# Patient Record
Sex: MALE | Race: WHITE | Employment: FULL TIME | URBAN - METROPOLITAN AREA
[De-identification: names, ages, dates, MRNs, and addresses within clinical notes are randomized per-mention and may not be internally consistent; named-entity substitution may affect disease eponyms.]

---

## 2019-11-05 PROBLEM — R06.00 DYSPNEA ON EXERTION: Status: ACTIVE | Noted: 2019-11-05

## 2019-11-05 PROBLEM — R06.09 DYSPNEA ON EXERTION: Status: ACTIVE | Noted: 2019-11-05

## 2020-06-12 PROBLEM — R35.1 BPH ASSOCIATED WITH NOCTURIA: Status: ACTIVE | Noted: 2020-06-12

## 2020-06-12 PROBLEM — I25.84 CORONARY ARTERY CALCIFICATION: Status: ACTIVE | Noted: 2020-06-12

## 2020-06-12 PROBLEM — H25.9 AGE-RELATED CATARACT OF BOTH EYES: Status: ACTIVE | Noted: 2020-06-12

## 2020-06-12 PROBLEM — E66.01 CLASS 2 SEVERE OBESITY DUE TO EXCESS CALORIES WITH SERIOUS COMORBIDITY IN ADULT, UNSPECIFIED BMI (HCC): Status: ACTIVE | Noted: 2020-06-12

## 2020-06-12 PROBLEM — I25.10 CORONARY ARTERY CALCIFICATION: Status: RESOLVED | Noted: 2020-06-12 | Resolved: 2020-06-12

## 2020-06-12 PROBLEM — E78.5 DYSLIPIDEMIA: Status: ACTIVE | Noted: 2020-06-12

## 2020-06-12 PROBLEM — I25.84 CORONARY ARTERY CALCIFICATION: Status: RESOLVED | Noted: 2020-06-12 | Resolved: 2020-06-12

## 2020-06-12 PROBLEM — J84.9 INTERSTITIAL LUNG DISEASE (HCC): Status: ACTIVE | Noted: 2020-06-12

## 2020-06-12 PROBLEM — N40.1 BPH ASSOCIATED WITH NOCTURIA: Status: ACTIVE | Noted: 2020-06-12

## 2020-06-12 PROBLEM — J47.9 BRONCHIECTASIS (HCC): Status: ACTIVE | Noted: 2020-06-12

## 2020-06-12 PROBLEM — M47.819 ARTHRITIS OF LOW BACK: Status: ACTIVE | Noted: 2020-06-12

## 2020-06-12 PROBLEM — J84.10 GRANULOMATOUS LUNG DISEASE (HCC): Status: ACTIVE | Noted: 2020-06-12

## 2020-06-12 PROBLEM — I51.5 CARDIAC CALCIFICATION (HCC): Status: ACTIVE | Noted: 2020-06-12

## 2020-06-12 PROBLEM — I70.0 AORTIC ATHEROSCLEROSIS (HCC): Status: ACTIVE | Noted: 2020-06-12

## 2020-06-12 PROBLEM — I25.10 CORONARY ARTERY CALCIFICATION: Status: ACTIVE | Noted: 2020-06-12

## 2020-06-12 PROBLEM — N40.1 BENIGN PROSTATIC HYPERPLASIA WITH LOWER URINARY TRACT SYMPTOMS: Status: ACTIVE | Noted: 2020-06-12

## 2020-06-12 PROBLEM — J67.9 PNEUMONITIS, HYPERSENSITIVITY (HCC): Status: ACTIVE | Noted: 2020-06-12

## 2020-06-12 PROBLEM — N40.1 BENIGN PROSTATIC HYPERPLASIA WITH LOWER URINARY TRACT SYMPTOMS: Status: RESOLVED | Noted: 2020-06-12 | Resolved: 2020-06-12

## 2020-10-12 PROBLEM — R97.20 ELEVATED PSA: Status: ACTIVE | Noted: 2020-10-12

## 2020-10-15 PROBLEM — I65.23 BILATERAL CAROTID ARTERY STENOSIS: Status: ACTIVE | Noted: 2020-10-15

## 2020-12-15 ENCOUNTER — ANESTHESIA (OUTPATIENT)
Dept: SURGERY | Facility: HOSPITAL | Age: 75
End: 2020-12-15
Payer: MEDICARE

## 2020-12-15 ENCOUNTER — ANESTHESIA EVENT (OUTPATIENT)
Dept: SURGERY | Facility: HOSPITAL | Age: 75
End: 2020-12-15
Payer: MEDICARE

## 2020-12-15 ENCOUNTER — HOSPITAL ENCOUNTER (OUTPATIENT)
Facility: HOSPITAL | Age: 75
Setting detail: HOSPITAL OUTPATIENT SURGERY
Discharge: HOME | End: 2020-12-15
Attending: OPHTHALMOLOGY | Admitting: OPHTHALMOLOGY
Payer: MEDICARE

## 2020-12-15 VITALS
RESPIRATION RATE: 16 BRPM | HEIGHT: 71.5 IN | TEMPERATURE: 97 F | WEIGHT: 225 LBS | DIASTOLIC BLOOD PRESSURE: 75 MMHG | OXYGEN SATURATION: 97 % | SYSTOLIC BLOOD PRESSURE: 155 MMHG | BODY MASS INDEX: 30.81 KG/M2 | HEART RATE: 52 BPM

## 2020-12-15 DIAGNOSIS — H25.811 COMBINED FORMS OF AGE-RELATED CATARACT OF RIGHT EYE: Primary | ICD-10-CM

## 2020-12-15 PROCEDURE — 08RK3JZ REPLACEMENT OF LEFT LENS WITH SYNTHETIC SUBSTITUTE, PERCUTANEOUS APPROACH: ICD-10-PCS | Performed by: OPHTHALMOLOGY

## 2020-12-15 DEVICE — IMPLANTABLE DEVICE: Type: IMPLANTABLE DEVICE | Site: EYE | Status: FUNCTIONAL

## 2020-12-15 RX ORDER — TROPICAMIDE 10 MG/ML
1 SOLUTION/ DROPS OPHTHALMIC SEE ADMIN INSTRUCTIONS
Status: COMPLETED | OUTPATIENT
Start: 2020-12-15 | End: 2020-12-15

## 2020-12-15 RX ORDER — ONDANSETRON 2 MG/ML
4 INJECTION INTRAMUSCULAR; INTRAVENOUS AS NEEDED
Status: DISCONTINUED | OUTPATIENT
Start: 2020-12-15 | End: 2020-12-15

## 2020-12-15 RX ORDER — NALOXONE HYDROCHLORIDE 0.4 MG/ML
80 INJECTION, SOLUTION INTRAMUSCULAR; INTRAVENOUS; SUBCUTANEOUS AS NEEDED
Status: DISCONTINUED | OUTPATIENT
Start: 2020-12-15 | End: 2020-12-15

## 2020-12-15 RX ORDER — LIDOCAINE HYDROCHLORIDE 10 MG/ML
INJECTION, SOLUTION EPIDURAL; INFILTRATION; INTRACAUDAL; PERINEURAL AS NEEDED
Status: DISCONTINUED | OUTPATIENT
Start: 2020-12-15 | End: 2020-12-15 | Stop reason: HOSPADM

## 2020-12-15 RX ORDER — MOXIFLOXACIN 5 MG/ML
SOLUTION/ DROPS OPHTHALMIC AS NEEDED
Status: DISCONTINUED | OUTPATIENT
Start: 2020-12-15 | End: 2020-12-15 | Stop reason: HOSPADM

## 2020-12-15 RX ORDER — TETRACAINE HYDROCHLORIDE 5 MG/ML
1 SOLUTION OPHTHALMIC SEE ADMIN INSTRUCTIONS
Status: COMPLETED | OUTPATIENT
Start: 2020-12-15 | End: 2020-12-15

## 2020-12-15 RX ORDER — HYDROMORPHONE HYDROCHLORIDE 1 MG/ML
0.4 INJECTION, SOLUTION INTRAMUSCULAR; INTRAVENOUS; SUBCUTANEOUS EVERY 5 MIN PRN
Status: DISCONTINUED | OUTPATIENT
Start: 2020-12-15 | End: 2020-12-15

## 2020-12-15 RX ORDER — BALANCED SALT SOLUTION 6.4; .75; .48; .3; 3.9; 1.7 MG/ML; MG/ML; MG/ML; MG/ML; MG/ML; MG/ML
SOLUTION OPHTHALMIC AS NEEDED
Status: DISCONTINUED | OUTPATIENT
Start: 2020-12-15 | End: 2020-12-15 | Stop reason: HOSPADM

## 2020-12-15 RX ORDER — PHENYLEPHRINE HCL 2.5 %
1 DROPS OPHTHALMIC (EYE) SEE ADMIN INSTRUCTIONS
Status: COMPLETED | OUTPATIENT
Start: 2020-12-15 | End: 2020-12-15

## 2020-12-15 RX ORDER — SODIUM CHLORIDE, SODIUM LACTATE, POTASSIUM CHLORIDE, CALCIUM CHLORIDE 600; 310; 30; 20 MG/100ML; MG/100ML; MG/100ML; MG/100ML
INJECTION, SOLUTION INTRAVENOUS CONTINUOUS
Status: DISCONTINUED | OUTPATIENT
Start: 2020-12-15 | End: 2020-12-15

## 2020-12-15 RX ORDER — ACETAMINOPHEN 500 MG
1000 TABLET ORAL EVERY 6 HOURS PRN
COMMUNITY

## 2020-12-15 RX ORDER — MIDAZOLAM HYDROCHLORIDE 1 MG/ML
INJECTION INTRAMUSCULAR; INTRAVENOUS AS NEEDED
Status: DISCONTINUED | OUTPATIENT
Start: 2020-12-15 | End: 2020-12-15 | Stop reason: SURG

## 2020-12-15 RX ORDER — TETRACAINE HYDROCHLORIDE 5 MG/ML
SOLUTION OPHTHALMIC AS NEEDED
Status: DISCONTINUED | OUTPATIENT
Start: 2020-12-15 | End: 2020-12-15 | Stop reason: HOSPADM

## 2020-12-15 RX ORDER — CYCLOPENTOLATE HYDROCHLORIDE 10 MG/ML
1 SOLUTION/ DROPS OPHTHALMIC SEE ADMIN INSTRUCTIONS
Status: COMPLETED | OUTPATIENT
Start: 2020-12-15 | End: 2020-12-15

## 2020-12-15 RX ADMIN — MIDAZOLAM HYDROCHLORIDE 1 MG: 1 INJECTION INTRAMUSCULAR; INTRAVENOUS at 07:21:00

## 2020-12-15 NOTE — OPERATIVE REPORT
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 The same instrument was also used to perform hydroelineation.   Next, the phacoemulsification unit hand piece was used with a second instrument to perform removal of

## 2020-12-15 NOTE — ANESTHESIA PREPROCEDURE EVALUATION
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 Apply to both feet and toes daily x 4 weeks, Disp: 60 g, Rfl: 11, Past Month at Unknown time    •  Ciclopirox 8 % External Solution, Apply 1 Application topically nightly.  Apply nightly for 7 days, then remove with alcohol and repeat for 11 months t Cigarettes        Quit date: 3/1/1973        Years since quittin.8      Smokeless tobacco: Never Used      Tobacco comment: former social smoker when drinking    Alcohol use: Not Currently      Alcohol/week: 0.0 standard drinks      Comment: last drin

## 2020-12-15 NOTE — ANESTHESIA POSTPROCEDURE EVALUATION
[facility] Patient Status:  Hospital Outpatient Surgery   Age/Gender 66year old male MRN [id number]   Location [address] [place] [address] Attending Ck Emmanuel MD   Hosp Day # 0 PCP MD Neal Lopez

## 2020-12-15 NOTE — H&P
[address] Patient Status:  Hospital Outpatient Surgery    10/31/1945 MRN [id number]   Location [address] [place] [address] Attending Bettie Benavidez MD   [facility] Day # 0 PCP MD Maggie Byrd Comment:cat    Medications:    •  acetaminophen 500 MG Oral Tab, Take 1,000 mg by mouth every 6 (six) hours as needed for Pain., Disp: , Rfl: , 12/15/2020 at 0315    •  zolpidem 5 MG Oral Tab, Take 1 tablet (5 mg total) by mouth daily. , Disp: 30 tablet, Rf 4.00 years of use. He has never used smokeless tobacco. He reports previous alcohol use. He reports that he does not use drugs.     Family History:  Family History   Problem Relation Age of Onset   • Cancer Mother    • Cancer Sister        Review of Systems

## 2020-12-15 NOTE — OR NURSING
Discharge instructions discussed with patient, verbalized understanding. Patient has eye drops and is aware how to take them. Waiting on ride to get here.

## 2021-01-27 ENCOUNTER — OFFICE VISIT (OUTPATIENT)
Dept: INTERNAL MEDICINE CLINIC | Facility: CLINIC | Age: 76
End: 2021-01-27
Payer: MEDICARE

## 2021-01-27 VITALS
HEIGHT: 71.5 IN | BODY MASS INDEX: 31.5 KG/M2 | DIASTOLIC BLOOD PRESSURE: 70 MMHG | RESPIRATION RATE: 18 BRPM | SYSTOLIC BLOOD PRESSURE: 126 MMHG | OXYGEN SATURATION: 92 % | HEART RATE: 58 BPM | WEIGHT: 230 LBS

## 2021-01-27 DIAGNOSIS — B35.4 TINEA CORPORIS: ICD-10-CM

## 2021-01-27 DIAGNOSIS — N64.4 BREAST TENDERNESS IN MALE: Primary | ICD-10-CM

## 2021-01-27 DIAGNOSIS — J84.9 INTERSTITIAL LUNG DISEASE (HCC): ICD-10-CM

## 2021-01-27 PROBLEM — J44.9 ASTHMA WITH COPD (CHRONIC OBSTRUCTIVE PULMONARY DISEASE) (HCC): Chronic | Status: ACTIVE | Noted: 2021-01-27

## 2021-01-27 PROBLEM — J44.89 ASTHMA WITH COPD (CHRONIC OBSTRUCTIVE PULMONARY DISEASE): Chronic | Status: ACTIVE | Noted: 2021-01-27

## 2021-01-27 PROBLEM — J44.9 ASTHMA WITH COPD (CHRONIC OBSTRUCTIVE PULMONARY DISEASE): Chronic | Status: ACTIVE | Noted: 2021-01-27

## 2021-01-27 PROCEDURE — 99204 OFFICE O/P NEW MOD 45 MIN: CPT | Performed by: INTERNAL MEDICINE

## 2021-01-27 PROCEDURE — 3078F DIAST BP <80 MM HG: CPT | Performed by: INTERNAL MEDICINE

## 2021-01-27 PROCEDURE — 3008F BODY MASS INDEX DOCD: CPT | Performed by: INTERNAL MEDICINE

## 2021-01-27 PROCEDURE — 3074F SYST BP LT 130 MM HG: CPT | Performed by: INTERNAL MEDICINE

## 2021-01-27 RX ORDER — CLOTRIMAZOLE AND BETAMETHASONE DIPROPIONATE 10; .64 MG/G; MG/G
CREAM TOPICAL
Qty: 60 G | Refills: 3 | Status: SHIPPED | OUTPATIENT
Start: 2021-01-27

## 2021-01-27 NOTE — PROGRESS NOTES
HPI:    Patient ID: Nesha Doherty is a 66year old male. HPIPatient here to establish as a new pt. Has hx of cat allergy and asthma, hyperlipidemia  And acutely has had some tenderness of the left nipple.   Also has had some dysequilibrium transie Physical Exam   Constitutional: He appears well-developed and well-nourished. HENT:   Head: Normocephalic. Eyes: Pupils are equal, round, and reactive to light. Conjunctivae are normal. No scleral icterus. Neck: No JVD present.  No thyromegaly prese

## 2021-01-29 ENCOUNTER — TELEPHONE (OUTPATIENT)
Dept: INTERNAL MEDICINE CLINIC | Facility: CLINIC | Age: 76
End: 2021-01-29

## 2021-01-29 DIAGNOSIS — N64.4 BREAST TENDERNESS IN MALE: Primary | ICD-10-CM

## 2021-01-29 NOTE — TELEPHONE ENCOUNTER
RN spoke with co-worker at [employer]. Entered order for bilat diagnostic mammogram with US if needed. Dx: breast tenderness in male.     Also submitted a referral for insurance approval given this is not a screening but diagnostic

## 2021-01-29 NOTE — TELEPHONE ENCOUNTER
Tariq Riley from [facility] Mammography Dept, called. This patient is scheduled Monday for testing, due to breast tenderness.     The order needs to be put in for a diagnostic test.  They can't use the fake order that was put in by the scheduling departme

## 2021-02-01 ENCOUNTER — HOSPITAL ENCOUNTER (OUTPATIENT)
Dept: MAMMOGRAPHY | Facility: HOSPITAL | Age: 76
Discharge: HOME | End: 2021-02-01
Attending: INTERNAL MEDICINE
Payer: MEDICARE

## 2021-02-01 ENCOUNTER — TELEPHONE (OUTPATIENT)
Dept: INTERNAL MEDICINE CLINIC | Facility: CLINIC | Age: 76
End: 2021-02-01

## 2021-02-01 DIAGNOSIS — N64.4 BREAST TENDERNESS IN MALE: ICD-10-CM

## 2021-02-01 DIAGNOSIS — Z23 NEED FOR VACCINATION: ICD-10-CM

## 2021-02-01 PROCEDURE — 76642 ULTRASOUND BREAST LIMITED: CPT | Performed by: INTERNAL MEDICINE

## 2021-02-01 PROCEDURE — 77062 BREAST TOMOSYNTHESIS BI: CPT | Performed by: INTERNAL MEDICINE

## 2021-02-01 PROCEDURE — 77066 DX MAMMO INCL CAD BI: CPT | Performed by: INTERNAL MEDICINE

## 2021-02-15 ENCOUNTER — OFFICE VISIT (OUTPATIENT)
Dept: INTERNAL MEDICINE CLINIC | Facility: CLINIC | Age: 76
End: 2021-02-15
Payer: MEDICARE

## 2021-02-15 VITALS
HEART RATE: 72 BPM | DIASTOLIC BLOOD PRESSURE: 70 MMHG | SYSTOLIC BLOOD PRESSURE: 118 MMHG | HEIGHT: 71.5 IN | WEIGHT: 226.19 LBS | OXYGEN SATURATION: 96 % | BODY MASS INDEX: 30.97 KG/M2

## 2021-02-15 DIAGNOSIS — L91.8 SKIN TAGS, MULTIPLE ACQUIRED: ICD-10-CM

## 2021-02-15 DIAGNOSIS — E78.5 DYSLIPIDEMIA: Primary | ICD-10-CM

## 2021-02-15 LAB
ALBUMIN SERPL-MCNC: 3.9 G/DL (ref 3.4–5)
ALBUMIN/GLOB SERPL: 1 {RATIO} (ref 1–2)
ALP LIVER SERPL-CCNC: 75 U/L
ALT SERPL-CCNC: 22 U/L
ANION GAP SERPL CALC-SCNC: 5 MMOL/L (ref 0–18)
AST SERPL-CCNC: 16 U/L (ref 15–37)
BASOPHILS # BLD AUTO: 0.1 X10(3) UL (ref 0–0.2)
BASOPHILS NFR BLD AUTO: 1 %
BILIRUB SERPL-MCNC: 1 MG/DL (ref 0.1–2)
BUN BLD-MCNC: 12 MG/DL (ref 7–18)
BUN/CREAT SERPL: 12.6 (ref 10–20)
CALCIUM BLD-MCNC: 9.3 MG/DL (ref 8.5–10.1)
CHLORIDE SERPL-SCNC: 108 MMOL/L (ref 98–112)
CHOLEST SMN-MCNC: 190 MG/DL (ref ?–200)
CO2 SERPL-SCNC: 27 MMOL/L (ref 21–32)
CREAT BLD-MCNC: 0.95 MG/DL
DEPRECATED RDW RBC AUTO: 43.3 FL (ref 35.1–46.3)
EOSINOPHIL # BLD AUTO: 0.53 X10(3) UL (ref 0–0.7)
EOSINOPHIL NFR BLD AUTO: 5.5 %
ERYTHROCYTE [DISTWIDTH] IN BLOOD BY AUTOMATED COUNT: 12.9 % (ref 11–15)
GLOBULIN PLAS-MCNC: 4.1 G/DL (ref 2.8–4.4)
GLUCOSE BLD-MCNC: 126 MG/DL (ref 70–99)
HCT VFR BLD AUTO: 49.1 %
HDLC SERPL-MCNC: 37 MG/DL (ref 40–59)
HGB BLD-MCNC: 15.7 G/DL
IMM GRANULOCYTES # BLD AUTO: 0.02 X10(3) UL (ref 0–1)
IMM GRANULOCYTES NFR BLD: 0.2 %
LDLC SERPL CALC-MCNC: 108 MG/DL (ref ?–100)
LYMPHOCYTES # BLD AUTO: 3.49 X10(3) UL (ref 1–4)
LYMPHOCYTES NFR BLD AUTO: 36.4 %
M PROTEIN MFR SERPL ELPH: 8 G/DL (ref 6.4–8.2)
MCH RBC QN AUTO: 29.4 PG (ref 26–34)
MCHC RBC AUTO-ENTMCNC: 32 G/DL (ref 31–37)
MCV RBC AUTO: 91.9 FL
MONOCYTES # BLD AUTO: 0.94 X10(3) UL (ref 0.1–1)
MONOCYTES NFR BLD AUTO: 9.8 %
NEUTROPHILS # BLD AUTO: 4.51 X10 (3) UL (ref 1.5–7.7)
NEUTROPHILS # BLD AUTO: 4.51 X10(3) UL (ref 1.5–7.7)
NEUTROPHILS NFR BLD AUTO: 47.1 %
NONHDLC SERPL-MCNC: 153 MG/DL (ref ?–130)
OSMOLALITY SERPL CALC.SUM OF ELEC: 291 MOSM/KG (ref 275–295)
PATIENT FASTING Y/N/NP: NO
PATIENT FASTING Y/N/NP: NO
PLATELET # BLD AUTO: 283 10(3)UL (ref 150–450)
POTASSIUM SERPL-SCNC: 4.3 MMOL/L (ref 3.5–5.1)
RBC # BLD AUTO: 5.34 X10(6)UL
SODIUM SERPL-SCNC: 140 MMOL/L (ref 136–145)
TRIGL SERPL-MCNC: 225 MG/DL (ref 30–149)
VLDLC SERPL CALC-MCNC: 45 MG/DL (ref 0–30)
WBC # BLD AUTO: 9.6 X10(3) UL (ref 4–11)

## 2021-02-15 PROCEDURE — 80061 LIPID PANEL: CPT | Performed by: INTERNAL MEDICINE

## 2021-02-15 PROCEDURE — 80053 COMPREHEN METABOLIC PANEL: CPT | Performed by: INTERNAL MEDICINE

## 2021-02-15 PROCEDURE — 36415 COLL VENOUS BLD VENIPUNCTURE: CPT | Performed by: INTERNAL MEDICINE

## 2021-02-15 PROCEDURE — 3008F BODY MASS INDEX DOCD: CPT | Performed by: INTERNAL MEDICINE

## 2021-02-15 PROCEDURE — 3074F SYST BP LT 130 MM HG: CPT | Performed by: INTERNAL MEDICINE

## 2021-02-15 PROCEDURE — 11200 RMVL SKIN TAGS UP TO&INC 15: CPT | Performed by: INTERNAL MEDICINE

## 2021-02-15 PROCEDURE — 3078F DIAST BP <80 MM HG: CPT | Performed by: INTERNAL MEDICINE

## 2021-02-15 PROCEDURE — 85025 COMPLETE CBC W/AUTO DIFF WBC: CPT | Performed by: INTERNAL MEDICINE

## 2021-02-15 PROCEDURE — 99214 OFFICE O/P EST MOD 30 MIN: CPT | Performed by: INTERNAL MEDICINE

## 2021-02-15 NOTE — PROGRESS NOTES
HPI:    Patient ID: Molina Machado is a 66year old male. HPI Pt here for a check up and to remove some skin tags from chest wall and axilla. Had a normal mammogram left breast tenderness is less.     Review of Systems   All other systems reviewed taking: Reported on 1/27/2021 ) 60 g 11     Allergies:  Molds & Smuts           ANGIOEDEMA  Dander                  OTHER (SEE COMMENTS)    Comment:cat   PHYSICAL EXAM:   Physical Exam   Constitutional: He is oriented to person, place, and time.  He appears

## 2021-02-25 ENCOUNTER — TELEPHONE (OUTPATIENT)
Dept: INTERNAL MEDICINE CLINIC | Facility: CLINIC | Age: 76
End: 2021-02-25

## 2021-02-25 NOTE — TELEPHONE ENCOUNTER
RN line  MSG    Pt requesting call back regarding shooting pain to lower back-->especially when bending. No other details on msg. Can be reached @ [phone].

## 2021-03-03 ENCOUNTER — TELEPHONE (OUTPATIENT)
Dept: INTERNAL MEDICINE CLINIC | Facility: CLINIC | Age: 76
End: 2021-03-03

## 2021-03-03 NOTE — TELEPHONE ENCOUNTER
RN line  MSG     Pt requesting call back regarding shooting pain to lower back-->especially when bending.      No other details on msg.      Can be reached @ [phone].       ALSO ASKING FOR ORDER FOR HEARING TEST

## 2021-03-18 ENCOUNTER — TELEPHONE (OUTPATIENT)
Dept: CASE MANAGEMENT | Age: 76
End: 2021-03-18

## 2021-03-19 ENCOUNTER — TELEPHONE (OUTPATIENT)
Dept: INTERNAL MEDICINE CLINIC | Facility: CLINIC | Age: 76
End: 2021-03-19

## 2021-03-19 NOTE — TELEPHONE ENCOUNTER
Reached patient for medication adherence consult. Patient is past due for refill on atorvastatin, last filled 10/26/20 #90. Patient reports he is NOT taking the atorvastatin.  He tells me he did have the medication filled but that he never started [place]

## 2021-03-24 ENCOUNTER — TELEPHONE (OUTPATIENT)
Dept: INTERNAL MEDICINE CLINIC | Facility: CLINIC | Age: 76
End: 2021-03-24

## 2021-03-24 ENCOUNTER — HOSPITAL ENCOUNTER (OUTPATIENT)
Age: 76
Discharge: ANOTHER HOSPITAL | End: 2021-03-24
Attending: PHYSICIAN ASSISTANT
Payer: MEDICARE

## 2021-03-24 ENCOUNTER — APPOINTMENT (OUTPATIENT)
Dept: CT IMAGING | Facility: HOSPITAL | Age: 76
End: 2021-03-24
Attending: EMERGENCY MEDICINE
Payer: MEDICARE

## 2021-03-24 ENCOUNTER — APPOINTMENT (OUTPATIENT)
Dept: CT IMAGING | Facility: HOSPITAL | Age: 76
End: 2021-03-24
Payer: MEDICARE

## 2021-03-24 ENCOUNTER — HOSPITAL ENCOUNTER (EMERGENCY)
Facility: HOSPITAL | Age: 76
Discharge: HOME | End: 2021-03-24
Attending: EMERGENCY MEDICINE
Payer: MEDICARE

## 2021-03-24 ENCOUNTER — APPOINTMENT (OUTPATIENT)
Dept: GENERAL RADIOLOGY | Age: 76
End: 2021-03-24
Attending: PHYSICIAN ASSISTANT
Payer: MEDICARE

## 2021-03-24 VITALS
HEART RATE: 66 BPM | OXYGEN SATURATION: 96 % | SYSTOLIC BLOOD PRESSURE: 135 MMHG | RESPIRATION RATE: 20 BRPM | TEMPERATURE: 98 F | DIASTOLIC BLOOD PRESSURE: 72 MMHG

## 2021-03-24 VITALS
BODY MASS INDEX: 30.07 KG/M2 | SYSTOLIC BLOOD PRESSURE: 142 MMHG | HEART RATE: 60 BPM | TEMPERATURE: 99 F | HEIGHT: 72 IN | OXYGEN SATURATION: 96 % | WEIGHT: 222 LBS | RESPIRATION RATE: 16 BRPM | DIASTOLIC BLOOD PRESSURE: 91 MMHG

## 2021-03-24 DIAGNOSIS — S09.90XA CLOSED HEAD INJURY WITHOUT LOSS OF CONSCIOUSNESS, INITIAL ENCOUNTER: Primary | ICD-10-CM

## 2021-03-24 DIAGNOSIS — S63.619A SPRAIN OF FINGER, UNSPECIFIED FINGER, INITIAL ENCOUNTER: ICD-10-CM

## 2021-03-24 DIAGNOSIS — S09.8XXA BLUNT HEAD TRAUMA, INITIAL ENCOUNTER: Primary | ICD-10-CM

## 2021-03-24 DIAGNOSIS — H53.9 VISION ABNORMALITIES: Primary | ICD-10-CM

## 2021-03-24 DIAGNOSIS — Z98.42: ICD-10-CM

## 2021-03-24 DIAGNOSIS — S16.1XXA STRAIN OF NECK MUSCLE, INITIAL ENCOUNTER: ICD-10-CM

## 2021-03-24 DIAGNOSIS — S00.81XA FACIAL ABRASION, INITIAL ENCOUNTER: ICD-10-CM

## 2021-03-24 PROCEDURE — 99213 OFFICE O/P EST LOW 20 MIN: CPT

## 2021-03-24 PROCEDURE — 90471 IMMUNIZATION ADMIN: CPT

## 2021-03-24 PROCEDURE — 70450 CT HEAD/BRAIN W/O DYE: CPT | Performed by: EMERGENCY MEDICINE

## 2021-03-24 PROCEDURE — 72125 CT NECK SPINE W/O DYE: CPT | Performed by: EMERGENCY MEDICINE

## 2021-03-24 PROCEDURE — 99203 OFFICE O/P NEW LOW 30 MIN: CPT

## 2021-03-24 PROCEDURE — 99284 EMERGENCY DEPT VISIT MOD MDM: CPT

## 2021-03-24 PROCEDURE — 73130 X-RAY EXAM OF HAND: CPT | Performed by: PHYSICIAN ASSISTANT

## 2021-03-24 NOTE — TELEPHONE ENCOUNTER
Needs a retinal specialist per Dr. Demetra Lilly (now with [facility] in [facility]). Please advise. ALSO:  Patient fell today and hurt his hand. Doesn't think it's broken, but not sure. Suggested he go to IC as they have Xray Machine if needed.   He will

## 2021-03-24 NOTE — TELEPHONE ENCOUNTER
MSG    Pt left  requesting a new referral for a retina specialist. Had cataract surgery in December and still having vision problem.

## 2021-03-25 NOTE — ED PROVIDER NOTES
Patient Seen in: [facility]    History   Patient presents with:  Fall    Stated Complaint: hand and face injury from fall    HPI      47-year-old male presents with chief complaint of head injury.   Onset 1 hour prior to arrival.  Patient stat pneumonitis   • Visual impairment     glasses       Past Surgical History:   Procedure Laterality Date   • CHOLECYSTECTOMY  2003   • EYE CATARACT WITH INTRAOCULAR LENS Left 12/15/2020    Performed by Dali Cisneros MD at [facility] MAIN OR   • [address] [place] [address] as noted above. PSFH elements reviewed from today and agreed except as otherwise stated in HPI.     Physical Exam     ED Triage Vitals [03/24/21 1926]   /72   Pulse 66   Resp 20   Temp 98.4 °F (36.9 °C)   Temp src Temporal   SpO2 96 %   O2 Device N range of motion of left fourth and fifth digits with reported pain. Distal pulses intact. Capillary refill normal.  Motor intact distally. Sensory intact distally. No erythema. No open wounds. No compartment syndrome. No other edema.   Remainder of m unspecified finger, initial encounter    Disposition: Ic to ed    Follow-up:  No follow-up provider specified.     Medications Prescribed:  Current Discharge Medication List

## 2021-03-25 NOTE — ED INITIAL ASSESSMENT (HPI)
Patient states he fell a few hours ago on uneven sidewalk, states he fell forward into a grassy area, striking his head. Denies loc, nausea, dizziness. Patient with left hand pain s/p fall.

## 2021-03-26 ENCOUNTER — ORDER TRANSCRIPTION (OUTPATIENT)
Dept: PHYSICAL THERAPY | Facility: HOSPITAL | Age: 76
End: 2021-03-26

## 2021-03-26 DIAGNOSIS — M47.816 LUMBAR SPONDYLOSIS: Primary | ICD-10-CM

## 2021-03-26 NOTE — ED PROVIDER NOTES
Patient Seen in: [facility] AND [facility] Emergency Department      History   Patient presents with:  Fall    Stated Complaint: Fall, Injury.     HPI/Subjective:   HPI    66year old male who had mechanical fall pta hitting head and face on ground and also susta reoperation same year   • SPINE SURGERY PROCEDURE UNLISTED     • TONSILLECTOMY                  Social History    Tobacco Use      Smoking status: Former Smoker        Years: 4.00        Types: Cigarettes        Quit date: 3/1/1973        Years since [facility] respiratory distress. Breath sounds: Normal breath sounds. No wheezing. Abdominal:      General: There is no distension. Palpations: Abdomen is soft. Tenderness: There is no abdominal tenderness. There is no guarding.    Musculoskeletal: above. 3. Right apical 6 mm pulmonary nodule in the setting of biapical pleural/parenchymal scarring.   This finding could relate to nodular parenchymal scarring, however recommend a short-term follow-up chest CT for further assessment (which can be perform

## 2021-04-12 ENCOUNTER — OFFICE VISIT (OUTPATIENT)
Dept: PHYSICAL THERAPY | Age: 76
End: 2021-04-12
Attending: INTERNAL MEDICINE
Payer: MEDICARE

## 2021-04-12 DIAGNOSIS — M47.816 LUMBAR SPONDYLOSIS: ICD-10-CM

## 2021-04-12 PROCEDURE — 97162 PT EVAL MOD COMPLEX 30 MIN: CPT

## 2021-04-12 PROCEDURE — 97110 THERAPEUTIC EXERCISES: CPT

## 2021-04-12 NOTE — PROGRESS NOTES
LUMBAR SPINE EVALUATION:   Referring Physician: Dr. Gamez Failing  Diagnosis: Lumbar spondylosis (Y64.227)   Date of Service: 4/12/2021   Date of Onset: 2-3 weeks ago    PATIENT SUMMARY   Kaleb Segura is a 66year old y/o male who presents to therapy t kyphosis, forward trunk lean in standing    Lumbar ROM:            Pain (+/-)   Flexion          Mod loss            +   Extension Mod loss -   R Sidebend Min loss  +   L Sidebend Min loss +   R Rotation No loss +   L Rotation No loss +     Repeated Motion recreational activities. Frequency / Duration: Patient will be seen for 2x/week or a total of 10 visits over a 90 day period. Treatment will include: Manual Therapy; Therapeutic Exercises; Neuromuscular Re-education; Therapeutic Activity; Patient Garret Donaldson

## 2021-04-14 ENCOUNTER — OFFICE VISIT (OUTPATIENT)
Dept: PHYSICAL THERAPY | Age: 76
End: 2021-04-14
Attending: INTERNAL MEDICINE
Payer: MEDICARE

## 2021-04-14 DIAGNOSIS — M47.816 LUMBAR SPONDYLOSIS: ICD-10-CM

## 2021-04-14 PROCEDURE — 97110 THERAPEUTIC EXERCISES: CPT

## 2021-04-14 NOTE — PROGRESS NOTES
Dx:  Lumbar spondylosis (M47.816)         Authorized # of Visits:  8 ([facility])         Next MD visit: none scheduled  Fall Risk: standard         Precautions:  diabetes, high cholesterol, carotid artery stenosis, asthma with COPD, interstitial lung disea

## 2021-04-19 ENCOUNTER — OFFICE VISIT (OUTPATIENT)
Dept: PHYSICAL THERAPY | Age: 76
End: 2021-04-19
Attending: INTERNAL MEDICINE
Payer: MEDICARE

## 2021-04-19 DIAGNOSIS — M47.816 LUMBAR SPONDYLOSIS: ICD-10-CM

## 2021-04-19 PROCEDURE — 97110 THERAPEUTIC EXERCISES: CPT

## 2021-04-19 NOTE — PROGRESS NOTES
Dx:  Lumbar spondylosis (M47.816)         Authorized # of Visits:  8 ([facility])         Next MD visit: none scheduled  Fall Risk: standard         Precautions:  diabetes, high cholesterol, carotid artery stenosis, asthma with COPD, interstitial lung disea lumbar mobility, LE flexibility, core strength, patient education, posture and body mechanics    Charges: TE x 3       Total Timed Treatment: 40 min  Total Treatment Time: 40 min

## 2021-04-21 ENCOUNTER — OFFICE VISIT (OUTPATIENT)
Dept: PHYSICAL THERAPY | Age: 76
End: 2021-04-21
Attending: INTERNAL MEDICINE
Payer: MEDICARE

## 2021-04-21 DIAGNOSIS — M47.816 LUMBAR SPONDYLOSIS: ICD-10-CM

## 2021-04-21 PROCEDURE — 97110 THERAPEUTIC EXERCISES: CPT

## 2021-04-21 NOTE — PROGRESS NOTES
Dx:  Lumbar spondylosis (M47.816)         Authorized # of Visits:  8 ([facility])         Next MD visit: none scheduled  Fall Risk: standard         Precautions:  diabetes, high cholesterol, carotid artery stenosis, asthma with COPD, interstitial lung disea perform all transfers with pain <2/10. 3. Patient will demo B hip strength at least 4+/5 to be able to squat to  a box with proper body mechanics  4.  Patient will demo good TA contraction to allow for proper bracing for ADLs and recreational [place]

## 2021-04-26 ENCOUNTER — OFFICE VISIT (OUTPATIENT)
Dept: PHYSICAL THERAPY | Age: 76
End: 2021-04-26
Attending: INTERNAL MEDICINE
Payer: MEDICARE

## 2021-04-26 DIAGNOSIS — M47.816 LUMBAR SPONDYLOSIS: ICD-10-CM

## 2021-04-26 PROCEDURE — 97110 THERAPEUTIC EXERCISES: CPT

## 2021-04-26 NOTE — PROGRESS NOTES
Dx:  Lumbar spondylosis (M47.816)         Authorized # of Visits:  8 ([facility])         Next MD visit: none scheduled  Fall Risk: standard         Precautions:  diabetes, high cholesterol, carotid artery stenosis, asthma with COPD, interstitial lung disea of their symptoms. 2. Patient will demo lumbar AROM min loss or better in all limited planes to be able to perform all transfers with pain <2/10.   3. Patient will demo B hip strength at least 4+/5 to be able to squat to  a box with proper body mech

## 2021-04-28 ENCOUNTER — OFFICE VISIT (OUTPATIENT)
Dept: PHYSICAL THERAPY | Age: 76
End: 2021-04-28
Attending: INTERNAL MEDICINE
Payer: MEDICARE

## 2021-04-28 DIAGNOSIS — M47.816 LUMBAR SPONDYLOSIS: ICD-10-CM

## 2021-04-28 PROCEDURE — 97110 THERAPEUTIC EXERCISES: CPT

## 2021-04-28 NOTE — PROGRESS NOTES
Dx:  Lumbar spondylosis (M47.816)         Authorized # of Visits:  8 ([facility])         Next MD visit: none scheduled  Fall Risk: standard         Precautions:  diabetes, high cholesterol, carotid artery stenosis, asthma with COPD, interstitial lung disea stretches         Assessment: Focused on gentle neutral spine core strengthening and hamstring stretching secondary to flare up in pain since last visit. Patient tolerates all exercises with no adverse effects.      Goals (to be achieved in 10 visits):    1

## 2021-05-03 ENCOUNTER — APPOINTMENT (OUTPATIENT)
Dept: PHYSICAL THERAPY | Age: 76
End: 2021-05-03
Attending: INTERNAL MEDICINE
Payer: MEDICARE

## 2021-05-05 ENCOUNTER — OFFICE VISIT (OUTPATIENT)
Dept: PHYSICAL THERAPY | Age: 76
End: 2021-05-05
Attending: INTERNAL MEDICINE
Payer: MEDICARE

## 2021-05-05 DIAGNOSIS — M47.816 LUMBAR SPONDYLOSIS: ICD-10-CM

## 2021-05-05 PROCEDURE — 97110 THERAPEUTIC EXERCISES: CPT

## 2021-05-05 PROCEDURE — 97140 MANUAL THERAPY 1/> REGIONS: CPT

## 2021-05-05 NOTE — PROGRESS NOTES
Dx:  Lumbar spondylosis (M47.816)         Authorized # of Visits:  8 ([facility])         Next MD visit: none scheduled  Fall Risk: standard         Precautions:  diabetes, high cholesterol, carotid artery stenosis, asthma with COPD, interstitial lung disea squat to  a box with proper body mechanics  4. Patient will demo good TA contraction to allow for proper bracing for ADLs and recreational activities.       Plan: Continue PT to improve lumbar mobility, LE flexibility, core strength, patient educatio

## 2021-05-26 ENCOUNTER — APPOINTMENT (OUTPATIENT)
Dept: PHYSICAL THERAPY | Age: 76
End: 2021-05-26
Attending: INTERNAL MEDICINE
Payer: MEDICARE

## 2021-06-07 ENCOUNTER — OFFICE VISIT (OUTPATIENT)
Dept: INTERNAL MEDICINE CLINIC | Facility: CLINIC | Age: 76
End: 2021-06-07
Payer: MEDICARE

## 2021-06-07 VITALS
DIASTOLIC BLOOD PRESSURE: 64 MMHG | BODY MASS INDEX: 29.8 KG/M2 | HEIGHT: 72 IN | WEIGHT: 220 LBS | SYSTOLIC BLOOD PRESSURE: 122 MMHG

## 2021-06-07 DIAGNOSIS — I51.5 CARDIAC CALCIFICATION (HCC): ICD-10-CM

## 2021-06-07 DIAGNOSIS — I70.0 ATHEROSCLEROSIS OF AORTA (HCC): ICD-10-CM

## 2021-06-07 DIAGNOSIS — M47.819 ARTHRITIS OF LOW BACK: ICD-10-CM

## 2021-06-07 DIAGNOSIS — Z00.00 MEDICARE ANNUAL WELLNESS VISIT, SUBSEQUENT: Primary | ICD-10-CM

## 2021-06-07 PROBLEM — J44.9 ASTHMA WITH COPD (CHRONIC OBSTRUCTIVE PULMONARY DISEASE) (HCC): Chronic | Status: RESOLVED | Noted: 2021-01-27 | Resolved: 2021-06-07

## 2021-06-07 PROBLEM — J44.89 ASTHMA WITH COPD (CHRONIC OBSTRUCTIVE PULMONARY DISEASE): Chronic | Status: RESOLVED | Noted: 2021-01-27 | Resolved: 2021-06-07

## 2021-06-07 PROBLEM — J44.9 ASTHMA WITH COPD (CHRONIC OBSTRUCTIVE PULMONARY DISEASE): Chronic | Status: RESOLVED | Noted: 2021-01-27 | Resolved: 2021-06-07

## 2021-06-07 PROCEDURE — 99397 PER PM REEVAL EST PAT 65+ YR: CPT | Performed by: INTERNAL MEDICINE

## 2021-06-07 PROCEDURE — 84153 ASSAY OF PSA TOTAL: CPT | Performed by: INTERNAL MEDICINE

## 2021-06-07 PROCEDURE — 3008F BODY MASS INDEX DOCD: CPT | Performed by: INTERNAL MEDICINE

## 2021-06-07 PROCEDURE — G0439 PPPS, SUBSEQ VISIT: HCPCS | Performed by: INTERNAL MEDICINE

## 2021-06-07 PROCEDURE — 3078F DIAST BP <80 MM HG: CPT | Performed by: INTERNAL MEDICINE

## 2021-06-07 PROCEDURE — 3074F SYST BP LT 130 MM HG: CPT | Performed by: INTERNAL MEDICINE

## 2021-06-07 PROCEDURE — 96160 PT-FOCUSED HLTH RISK ASSMT: CPT | Performed by: INTERNAL MEDICINE

## 2021-06-07 NOTE — PROGRESS NOTES
HPI/Subjective:   Patient ID: Shawna Martinez is a 66year old male. HPIPt here for a MA supervisit.     History/Other:   Review of Systems  Current Outpatient Medications   Medication Sig Dispense Refill   • Diclofenac Sodium (VOLTAREN) 1 % Externa calcification    General Health     In the past six months, have you lost more than 10 pounds without trying?: 2 - No    Has your appetite been poor?: No    Type of Diet: Balanced    How does the patient maintain a good energy level?: Appropriate Exercise Correct    What year is it?: Correct    Recall \"Ball\": Correct    Recall \"Flag\": Correct    Recall \"Tree\": Correct      ALLERGIES:     Molds & Smuts           ANGIOEDEMA  Dander                  OTHER (SEE COMMENTS)    Comment:cat    CURRENT MEDICATI serious comorbidity in adult, unspecified BMI ([facility] [address] 6/12/2020   • Coronary artery calcification 6/12/2020   • Dyslipidemia 6/12/2020   • Dyspnea on exertion; 2019 two years 11/5/2019 11/5/2019 referred to pulmonary   • Elevated PSA; 2020 10/12/2020   • thyroid history  ALL/ASTHMA: denies hx of allergy or asthma    EXAM:   /64   Ht 6' (1.829 m)   Wt 220 lb (99.8 kg)   BMI 29.84 kg/m²      > Wt Readings from Last 6 Encounters:  06/07/21 : 220 lb (99.8 kg)  03/24/21 : 222 lb (100.7 kg)  02/15/21 : 226 found for: A1C No flowsheet data found.     Fasting Blood Sugar (FSB)Annually No results found for: GLUCOSE    Cardiovascular Disease Screening     LDL Annually LDL Cholesterol (mg/dL)   Date Value   02/15/2021 108 (H)     Calculated LDL (mg/dL)   Date Valu DIGOXIN, DIG, VALP No flowsheet data found. Diabetes      HgbA1C  Annually No results found for: A1C No flowsheet data found.     Creat/alb ratio  Annually      LDL  Annually LDL Cholesterol (mg/dL)   Date Value   02/15/2021 108 (H)     Calculated LDL (m

## 2021-06-15 ENCOUNTER — OFFICE VISIT (OUTPATIENT)
Dept: PHYSICAL THERAPY | Age: 76
End: 2021-06-15
Attending: INTERNAL MEDICINE
Payer: MEDICARE

## 2021-06-15 PROCEDURE — 97110 THERAPEUTIC EXERCISES: CPT

## 2021-06-15 NOTE — PROGRESS NOTES
Dx:  Lumbar spondylosis (M47.816)         Authorized # of Visits:  8 ([facility])         Next MD visit: none scheduled  Fall Risk: standard         Precautions:  diabetes, high cholesterol, carotid artery stenosis, asthma with COPD, interstitial lung disea therapy for several weeks. Patient denies any increased pain post session. Goals (to be achieved in 10 visits):    1. Patient will be independent with HEP, it's progression, and self-management of their symptoms.   2. Patient will demo lumbar AROM min l

## 2021-06-17 ENCOUNTER — APPOINTMENT (OUTPATIENT)
Dept: PHYSICAL THERAPY | Age: 76
End: 2021-06-17
Attending: INTERNAL MEDICINE
Payer: MEDICARE

## 2021-06-22 ENCOUNTER — OFFICE VISIT (OUTPATIENT)
Dept: PHYSICAL THERAPY | Age: 76
End: 2021-06-22
Attending: INTERNAL MEDICINE
Payer: MEDICARE

## 2021-06-22 PROCEDURE — 97110 THERAPEUTIC EXERCISES: CPT

## 2021-06-22 NOTE — PROGRESS NOTES
Myra  Pt has attended 9 visits in Physical Therapy.      Dx:  Lumbar spondylosis (M47.816)         Authorized # of Visits:  8 ([facility])         Next MD visit: none scheduled  Fall Risk: standard         Precautions:  diabetes, high cholester well as improved B hip and core strength. Patient has met majority of goals and will be discharged from PT at this time. Goals (to be achieved in 10 visits):    1.  Patient will be independent with HEP, it's progression, and self-management of their sym

## 2021-07-22 ENCOUNTER — APPOINTMENT (OUTPATIENT)
Dept: GENERAL RADIOLOGY | Age: 76
End: 2021-07-22
Attending: EMERGENCY MEDICINE
Payer: MEDICARE

## 2021-07-22 ENCOUNTER — APPOINTMENT (OUTPATIENT)
Dept: CT IMAGING | Age: 76
End: 2021-07-22
Attending: EMERGENCY MEDICINE
Payer: MEDICARE

## 2021-07-22 ENCOUNTER — HOSPITAL ENCOUNTER (OUTPATIENT)
Age: 76
Discharge: HOME | End: 2021-07-22
Attending: EMERGENCY MEDICINE
Payer: MEDICARE

## 2021-07-22 VITALS
OXYGEN SATURATION: 97 % | SYSTOLIC BLOOD PRESSURE: 153 MMHG | RESPIRATION RATE: 24 BRPM | TEMPERATURE: 98 F | HEART RATE: 55 BPM | DIASTOLIC BLOOD PRESSURE: 65 MMHG

## 2021-07-22 DIAGNOSIS — T07.XXXA MULTIPLE CONTUSIONS: ICD-10-CM

## 2021-07-22 DIAGNOSIS — S09.90XA CLOSED HEAD INJURY, INITIAL ENCOUNTER: Primary | ICD-10-CM

## 2021-07-22 DIAGNOSIS — S52.124A CLOSED NONDISPLACED FRACTURE OF HEAD OF RIGHT RADIUS, INITIAL ENCOUNTER: ICD-10-CM

## 2021-07-22 DIAGNOSIS — S37.812A CONTUSION OF ADRENAL GLAND, INITIAL ENCOUNTER: ICD-10-CM

## 2021-07-22 LAB
#MXD IC: 1.1 X10ˆ3/UL (ref 0.1–1)
CREAT BLD-MCNC: 0.9 MG/DL
GLUCOSE BLD-MCNC: 188 MG/DL (ref 70–99)
HCT VFR BLD AUTO: 46.3 %
HGB BLD-MCNC: 14.8 G/DL
ISTAT BUN: 13 MG/DL (ref 7–18)
ISTAT CHLORIDE: 103 MMOL/L (ref 98–112)
ISTAT HEMATOCRIT: 49 %
ISTAT IONIZED CALCIUM FOR CHEM 8: 1.23 MMOL/L (ref 1.12–1.32)
ISTAT POTASSIUM: 3.9 MMOL/L (ref 3.6–5.1)
ISTAT SODIUM: 142 MMOL/L (ref 136–145)
ISTAT TCO2: 26 MMOL/L (ref 21–32)
LYMPHOCYTES # BLD AUTO: 1.8 X10ˆ3/UL (ref 1–4)
LYMPHOCYTES NFR BLD AUTO: 14.1 %
MCH RBC QN AUTO: 29.4 PG (ref 26–34)
MCHC RBC AUTO-ENTMCNC: 32 G/DL (ref 31–37)
MCV RBC AUTO: 92 FL (ref 80–100)
MIXED CELL %: 8.8 %
NEUTROPHILS # BLD AUTO: 10 X10ˆ3/UL (ref 1.5–7.7)
NEUTROPHILS NFR BLD AUTO: 77.1 %
PLATELET # BLD AUTO: 289 X10ˆ3/UL (ref 150–450)
RBC # BLD AUTO: 5.03 X10ˆ6/UL
WBC # BLD AUTO: 12.9 X10ˆ3/UL (ref 4–11)

## 2021-07-22 PROCEDURE — 96360 HYDRATION IV INFUSION INIT: CPT

## 2021-07-22 PROCEDURE — 99215 OFFICE O/P EST HI 40 MIN: CPT

## 2021-07-22 PROCEDURE — 73140 X-RAY EXAM OF FINGER(S): CPT | Performed by: EMERGENCY MEDICINE

## 2021-07-22 PROCEDURE — 73502 X-RAY EXAM HIP UNI 2-3 VIEWS: CPT | Performed by: EMERGENCY MEDICINE

## 2021-07-22 PROCEDURE — 71260 CT THORAX DX C+: CPT | Performed by: EMERGENCY MEDICINE

## 2021-07-22 PROCEDURE — 73080 X-RAY EXAM OF ELBOW: CPT | Performed by: EMERGENCY MEDICINE

## 2021-07-22 PROCEDURE — 29105 APPLICATION LONG ARM SPLINT: CPT

## 2021-07-22 PROCEDURE — 96361 HYDRATE IV INFUSION ADD-ON: CPT

## 2021-07-22 PROCEDURE — 72125 CT NECK SPINE W/O DYE: CPT | Performed by: EMERGENCY MEDICINE

## 2021-07-22 PROCEDURE — 70450 CT HEAD/BRAIN W/O DYE: CPT | Performed by: EMERGENCY MEDICINE

## 2021-07-22 PROCEDURE — 73110 X-RAY EXAM OF WRIST: CPT | Performed by: EMERGENCY MEDICINE

## 2021-07-22 PROCEDURE — 85025 COMPLETE CBC W/AUTO DIFF WBC: CPT | Performed by: EMERGENCY MEDICINE

## 2021-07-22 PROCEDURE — 80047 BASIC METABLC PNL IONIZED CA: CPT

## 2021-07-22 PROCEDURE — 73090 X-RAY EXAM OF FOREARM: CPT | Performed by: EMERGENCY MEDICINE

## 2021-07-22 PROCEDURE — 74177 CT ABD & PELVIS W/CONTRAST: CPT | Performed by: EMERGENCY MEDICINE

## 2021-07-22 RX ORDER — HYDROCODONE BITARTRATE AND ACETAMINOPHEN 5; 325 MG/1; MG/1
1 TABLET ORAL EVERY 6 HOURS PRN
Qty: 20 TABLET | Refills: 0 | Status: SHIPPED | OUTPATIENT
Start: 2021-07-22 | End: 2021-07-29

## 2021-07-22 RX ORDER — SODIUM CHLORIDE 9 MG/ML
1000 INJECTION, SOLUTION INTRAVENOUS ONCE
Status: COMPLETED | OUTPATIENT
Start: 2021-07-22 | End: 2021-07-22

## 2021-07-22 RX ORDER — IBUPROFEN 600 MG/1
600 TABLET ORAL ONCE
Status: COMPLETED | OUTPATIENT
Start: 2021-07-22 | End: 2021-07-22

## 2021-07-22 RX ORDER — LIDOCAINE 4 G/G
1 PATCH TOPICAL EVERY 24 HOURS
Qty: 15 PATCH | Refills: 1 | Status: SHIPPED | OUTPATIENT
Start: 2021-07-22 | End: 2021-09-15 | Stop reason: ALTCHOICE

## 2021-07-22 RX ORDER — KETOROLAC TROMETHAMINE 30 MG/ML
15 INJECTION, SOLUTION INTRAMUSCULAR; INTRAVENOUS ONCE
Status: DISCONTINUED | OUTPATIENT
Start: 2021-07-22 | End: 2021-07-22

## 2021-07-22 NOTE — ED PROVIDER NOTES
Patient Seen in: [facility]      History   Patient presents with:  Fall    Stated Complaint: FALL    HPI/Subjective:   HPI    The patient is a 15-year-old male with no significant past medical history who presents now after a fall.   The patie pneumonitis   • Visual impairment     glasses              Past Surgical History:   Procedure Laterality Date   • CHOLECYSTECTOMY  2003   • Uziel Mano  2008    for herniated disk, required reoperation same year   • [address]  Neurologic: Patient is awake, alert and oriented ×3. The patient's motor strength is 5 out of 5 and symmetric in the upper and lower extremities bilaterally  Extremities: There is focal tenderness to the lateral aspect of the right hip.   There is m Lidoderm patches for pain. Patient is called a friend who will come to take him home. Procedure note: Splint check    After placement of a right long-arm OCL, the proper joint is immobilized.   The right hand is neurovascularly intact    MDM      Closed

## 2021-07-22 NOTE — ED QUICK NOTES
CT and xrays completed, results pending. Patient placed in position of comfort. RUE elevated with cold packs in place. Patient denies nausea or dizziness. States he is in a lot pain to right flank and RUE. Instructed NPO. IV fluids infusing.

## 2021-07-22 NOTE — ED INITIAL ASSESSMENT (HPI)
Standing on a ladder painting when \"ladder broke\" causing him to fall 5ft onto right side about 1 hour PTA. No LOC. Denies neck pain. Abrasions noted to right temple, right knee, and feet. C/o pain \"all over\", right side of chest, and right hip.  No ashwin

## 2021-07-26 ENCOUNTER — TELEPHONE (OUTPATIENT)
Dept: INTERNAL MEDICINE CLINIC | Facility: CLINIC | Age: 76
End: 2021-07-26

## 2021-07-26 NOTE — TELEPHONE ENCOUNTER
Fell 6 feet off ladder onto right side. Was in [facility]; had numerous XRays done and was told to follow up with Ortho and Gen Surgery. Referrals entered for Dr. Vincenzo Al, orthopaedics, and  Dr. Mariposa Welch, general surgeon.  Sent Urgent request to referral team

## 2021-07-27 NOTE — TELEPHONE ENCOUNTER
Pt called and states that Dr Amirah Segovia is with [facility] and so Pt's HMO won't cover it. Pt also waited on hold for 30 minutes with no answer. Pt also states that hydrocodone is not doing anything. States that he needs to see someone soon for pain.

## 2021-07-30 ENCOUNTER — OFFICE VISIT (OUTPATIENT)
Dept: INTERNAL MEDICINE CLINIC | Facility: CLINIC | Age: 76
End: 2021-07-30
Payer: MEDICARE

## 2021-07-30 VITALS
HEIGHT: 72 IN | HEART RATE: 72 BPM | SYSTOLIC BLOOD PRESSURE: 120 MMHG | DIASTOLIC BLOOD PRESSURE: 82 MMHG | BODY MASS INDEX: 30.2 KG/M2 | OXYGEN SATURATION: 98 % | WEIGHT: 223 LBS | TEMPERATURE: 98 F

## 2021-07-30 DIAGNOSIS — S20.20XS: Primary | ICD-10-CM

## 2021-07-30 DIAGNOSIS — S37.812D: ICD-10-CM

## 2021-07-30 PROCEDURE — 3074F SYST BP LT 130 MM HG: CPT | Performed by: INTERNAL MEDICINE

## 2021-07-30 PROCEDURE — 3079F DIAST BP 80-89 MM HG: CPT | Performed by: INTERNAL MEDICINE

## 2021-07-30 PROCEDURE — 3008F BODY MASS INDEX DOCD: CPT | Performed by: INTERNAL MEDICINE

## 2021-07-30 PROCEDURE — 99214 OFFICE O/P EST MOD 30 MIN: CPT | Performed by: INTERNAL MEDICINE

## 2021-07-30 RX ORDER — OXYCODONE AND ACETAMINOPHEN 10; 325 MG/1; MG/1
1 TABLET ORAL EVERY 6 HOURS PRN
Qty: 40 TABLET | Refills: 0 | Status: SHIPPED | OUTPATIENT
Start: 2021-07-30 | End: 2021-09-15 | Stop reason: ALTCHOICE

## 2021-07-30 NOTE — PROGRESS NOTES
HPI/Subjective:   Patient ID: Raul Kenny is a 66year old male. HPI Pt here for a fu from ED after a fall off of a ladder from 5-6 ft up/ Ct of abdomen suggestive of right adrenal injury.  Patient states that current Norco 10mg is not highly ef

## 2021-08-09 ENCOUNTER — OFFICE VISIT (OUTPATIENT)
Dept: ORTHOPEDICS CLINIC | Facility: CLINIC | Age: 76
End: 2021-08-09
Payer: MEDICARE

## 2021-08-09 VITALS — WEIGHT: 220 LBS | HEIGHT: 71.5 IN | BODY MASS INDEX: 30.13 KG/M2

## 2021-08-09 DIAGNOSIS — S52.124A CLOSED NONDISPLACED FRACTURE OF HEAD OF RIGHT RADIUS, INITIAL ENCOUNTER: Primary | ICD-10-CM

## 2021-08-09 PROCEDURE — 24650 CLTX RDL HEAD/NCK FX WO MNPJ: CPT | Performed by: ORTHOPAEDIC SURGERY

## 2021-08-09 PROCEDURE — 99203 OFFICE O/P NEW LOW 30 MIN: CPT | Performed by: ORTHOPAEDIC SURGERY

## 2021-08-09 PROCEDURE — 3008F BODY MASS INDEX DOCD: CPT | Performed by: ORTHOPAEDIC SURGERY

## 2021-08-09 NOTE — PROGRESS NOTES
NURSING INTAKE COMMENTS: Patient presents with:  Elbow Pain: fell off ladder and fell on right side, IC xrays      HPI: This 66year old male presents today with complaints of right elbow pain. He is a right-hand-dominant .   He was about 5 feet up tablet by mouth every 6 (six) hours as needed for Pain. 40 tablet 0   • clotrimazole-betamethasone 1-0.05 % External Cream Topically bid 60 g 3   • acetaminophen 500 MG Oral Tab Take 1,000 mg by mouth every 6 (six) hours as needed for Pain.      • fluticaso no blood in urine, no difficulty urinating, no incontinence  MUSCULOSKELETAL: no other musculoskeletal complaints other than in HPI  NEURO: no numbness or tingling, no weakness or balance disorder  PSYCHE: no depression or anxiety  HEMATOLOGIC: no hx of bl fracture or dislocation. Mild narrowing of the PIP and DIP joints . SOFT TISSUES: Negative. No visible soft tissue swelling. EFFUSION: None visible. OTHER: Negative. CONCLUSION:  1. No acute appearing fracture or dislocation. See above.     Linwood Lee XR WRIST COMPLETE (MIN 3 VIEWS), RIGHT (CPT=73110)    Result Date: 7/22/2021  PROCEDURE: XR WRIST COMPLETE (MIN 3 VIEWS), RIGHT (CPT=73110)  COMPARISON: None. INDICATIONS: Right wrist pain post fall today. TECHNIQUE: 3 views were obtained.    FINDINGS lesion. SINUSES: Retention cysts in the bilateral maxillary sinuses, greater on the right. Sinus mucosal thickening right maxillary antrum . Right-sided sinonasal polyp measuring 12 mm suspected anterior margin right middle turbinate.  ORBITS: Limited vi C2-C3: No significant disc/facet abnormality, spinal stenosis, or foraminal stenosis. C3-C4: No significant disc/facet abnormality, spinal stenosis, or foraminal stenosis.   C4-C5: Broad disc osteophyte complex with ventral thecal sac effacement C5-C6: Bro dissection.   No evidence of acute vascular injury in the chest. LUNGS/PLEURA: Basilar posterior predominant smooth interlobular septal thickening, patchy ground-glass opacity, subpleural reticulation and scattered bandlike opacities are seen throughout bot Pelvic organs appropriate for patient age. VASCULATURE:   No aneurysm is detected. There is mild calcific atherosclerosis. RETROPERITONEUM: No mass or lymphadenopathy is apparent. BONES:   No significant bony lesion or fracture.   There is mild pubic sym Results of this examination were discussed with the patient's physician, Dr. Marya Thibodeaux, by Dr. Louisa Black at 15:48 on 07/22/2021.   Dictated by (CST): Beryle Queen, MD on 7/22/2021 at 3:33 PM     Finalized by (CST): Beryle Queen, MD on 7/22/2

## 2021-08-30 ENCOUNTER — TELEPHONE (OUTPATIENT)
Dept: INTERNAL MEDICINE CLINIC | Facility: CLINIC | Age: 76
End: 2021-08-30

## 2021-08-30 NOTE — TELEPHONE ENCOUNTER
Pt has an appointment with a retinal specialist tomorrow at 11:10am and was told he will need a new referral or will not be seen.

## 2021-08-30 NOTE — TELEPHONE ENCOUNTER
Patient is calling to get referral for tomorrows apt for Ophthalmology with Eri Gabriel MD. Please advise.

## 2021-09-15 ENCOUNTER — OFFICE VISIT (OUTPATIENT)
Dept: ORTHOPEDICS CLINIC | Facility: CLINIC | Age: 76
End: 2021-09-15
Payer: MEDICARE

## 2021-09-15 ENCOUNTER — HOSPITAL ENCOUNTER (OUTPATIENT)
Dept: GENERAL RADIOLOGY | Facility: HOSPITAL | Age: 76
Discharge: HOME | End: 2021-09-15
Attending: ORTHOPAEDIC SURGERY
Payer: MEDICARE

## 2021-09-15 DIAGNOSIS — S52.124A CLOSED NONDISPLACED FRACTURE OF HEAD OF RIGHT RADIUS, INITIAL ENCOUNTER: ICD-10-CM

## 2021-09-15 DIAGNOSIS — S52.124D CLOSED NONDISPLACED FRACTURE OF HEAD OF RIGHT RADIUS WITH ROUTINE HEALING, SUBSEQUENT ENCOUNTER: Primary | ICD-10-CM

## 2021-09-15 PROCEDURE — 73070 X-RAY EXAM OF ELBOW: CPT | Performed by: ORTHOPAEDIC SURGERY

## 2021-09-15 PROCEDURE — 99024 POSTOP FOLLOW-UP VISIT: CPT | Performed by: ORTHOPAEDIC SURGERY

## 2021-09-15 NOTE — PROGRESS NOTES
NURSING INTAKE COMMENTS: Patient presents with: Follow - Up: Patient is here for 1month follow up of the right arm/elbow. Patient denies any pain or swelling. HPI: This 66year old male presents today for follow-up of his right radial head fracture. Inhalation Aerosol Powder, Breath Activated Inhale 1 puff into the lungs 2 (two) times daily. 1 each 0   • acetaminophen 500 MG Oral Tab Take 1,000 mg by mouth every 6 (six) hours as needed for Pain.  (Patient not taking: Reported on 9/15/2021)     • zolpid visit. Constitutional: appears well hydrated, alert and responsive, no acute distress noted  Extremities: He lacks 10 degrees of extension of the right elbow with full flexion. No pain with pronation and supination. Imaging: No results found.      Ferjesús Bath

## 2021-09-20 ENCOUNTER — TELEPHONE (OUTPATIENT)
Dept: PHYSICAL THERAPY | Facility: HOSPITAL | Age: 76
End: 2021-09-20

## 2021-09-27 ENCOUNTER — TELEPHONE (OUTPATIENT)
Dept: PHYSICAL THERAPY | Facility: HOSPITAL | Age: 76
End: 2021-09-27

## 2021-09-28 ENCOUNTER — OFFICE VISIT (OUTPATIENT)
Dept: PHYSICAL THERAPY | Facility: HOSPITAL | Age: 76
End: 2021-09-28
Attending: ORTHOPAEDIC SURGERY
Payer: MEDICARE

## 2021-09-28 DIAGNOSIS — S52.124D CLOSED NONDISPLACED FRACTURE OF HEAD OF RIGHT RADIUS WITH ROUTINE HEALING, SUBSEQUENT ENCOUNTER: ICD-10-CM

## 2021-09-28 PROCEDURE — 97161 PT EVAL LOW COMPLEX 20 MIN: CPT

## 2021-09-28 PROCEDURE — 97140 MANUAL THERAPY 1/> REGIONS: CPT

## 2021-09-28 NOTE — PROGRESS NOTES
ELBOW AND HAND EVALUATION:   Referring Physician: Dr. Janis Cardozo  Diagnosis:  Closed nondisplaced fracture of head of right radius with routine healing, subsequent encounter (J42.628F)       Date of Service: 9/28/2021     PATIENT [address] [facility] [facility] 6/12/2020   • Hearing impairment     bilateral hearing aids   • High cholesterol    • Interstitial lung disease ([facility] [address] 6/12/2020   • Known health problems: none    • Pneumonitis, hypersensitivity ([facility] 6/12/2020 2020 possible cause of interstitial pneumo 5/5  Radial Deviation R 5/5; L 5/5     Strength (lbs) Right Left    [address] : 60 lbs  65 lbs    Pinch:       TTP along R TFCC with jt play. Accessory motion: Restricted R intercarpal gliding and volar radial head glide.      Today’s Treatment and Resp

## 2021-09-30 ENCOUNTER — OFFICE VISIT (OUTPATIENT)
Dept: PHYSICAL THERAPY | Facility: HOSPITAL | Age: 76
End: 2021-09-30
Attending: ORTHOPAEDIC SURGERY
Payer: MEDICARE

## 2021-09-30 PROCEDURE — 97110 THERAPEUTIC EXERCISES: CPT

## 2021-09-30 PROCEDURE — 97140 MANUAL THERAPY 1/> REGIONS: CPT

## 2021-10-04 ENCOUNTER — APPOINTMENT (OUTPATIENT)
Dept: PHYSICAL THERAPY | Age: 76
End: 2021-10-04
Attending: ORTHOPAEDIC SURGERY
Payer: MEDICARE

## 2021-10-04 NOTE — PROGRESS NOTES
Dx: Closed nondisplaced fracture of head of right radius with routine healing, subsequent encounter (T99.155V)  Insurance (Authorized # of Visits):  6 ([facility])  [id number] [address] Physician: Dr. Enmanuel Wise    Next MD visit: none scheduled  Precautions: none

## 2021-10-05 ENCOUNTER — OFFICE VISIT (OUTPATIENT)
Dept: PHYSICAL THERAPY | Facility: HOSPITAL | Age: 76
End: 2021-10-05
Attending: ORTHOPAEDIC SURGERY
Payer: MEDICARE

## 2021-10-05 PROCEDURE — 97140 MANUAL THERAPY 1/> REGIONS: CPT

## 2021-10-05 PROCEDURE — 97110 THERAPEUTIC EXERCISES: CPT

## 2021-10-11 ENCOUNTER — HOSPITAL ENCOUNTER (OUTPATIENT)
Dept: GENERAL RADIOLOGY | Facility: HOSPITAL | Age: 76
Discharge: HOME | End: 2021-10-11
Attending: ORTHOPAEDIC SURGERY
Payer: MEDICARE

## 2021-10-11 ENCOUNTER — OFFICE VISIT (OUTPATIENT)
Dept: ORTHOPEDICS CLINIC | Facility: CLINIC | Age: 76
End: 2021-10-11
Payer: MEDICARE

## 2021-10-11 DIAGNOSIS — Z47.89 ORTHOPEDIC AFTERCARE: ICD-10-CM

## 2021-10-11 DIAGNOSIS — Z47.89 ORTHOPEDIC AFTERCARE: Primary | ICD-10-CM

## 2021-10-11 PROCEDURE — 99024 POSTOP FOLLOW-UP VISIT: CPT | Performed by: ORTHOPAEDIC SURGERY

## 2021-10-11 PROCEDURE — 73080 X-RAY EXAM OF ELBOW: CPT | Performed by: ORTHOPAEDIC SURGERY

## 2021-10-11 RX ORDER — AMOXICILLIN 500 MG/1
CAPSULE ORAL
COMMUNITY
Start: 2021-10-07

## 2021-10-11 RX ORDER — ACETAMINOPHEN AND CODEINE PHOSPHATE 300; 30 MG/1; MG/1
TABLET ORAL
COMMUNITY
Start: 2021-10-07

## 2021-10-11 RX ORDER — IBUPROFEN 800 MG/1
TABLET ORAL
COMMUNITY
Start: 2021-10-07

## 2021-10-11 NOTE — PROGRESS NOTES
NURSING INTAKE COMMENTS: Patient presents with:  Elbow Pain: right elbow injury follow up, feels welll  Wrist Pain: follow up PT      HPI: This 66year old male presents today for follow-up on his elbow and wrist.  He does not complain of any pain.   He sti (pain). 350 g 0   • clotrimazole-betamethasone 1-0.05 % External Cream Topically bid 60 g 3   • acetaminophen 500 MG Oral Tab Take 1,000 mg by mouth every 6 (six) hours as needed for Pain.      • fluticasone-salmeterol (WIXELA INHUB) 100-50 MCG/DOSE Inhalat depression or anxiety  HEMATOLOGIC: no hx of blood dyscrasia  ENDOCRINE: no thyroid or diabetes issues  ALL/ASTHMA: no new hx of severe allergy or asthma    Physical Examination:    There were no vitals taken for this visit.   Constitutional: appears well h

## 2021-10-11 NOTE — PROGRESS NOTES
Dx: Closed nondisplaced fracture of head of right radius with routine healing, subsequent encounter (R01.636D)  Insurance (Authorized # of Visits):  6 ([facility])  [id number] [address] Physician: Dr. Farideh Metcalf    Next MD visit: none scheduled  Precautions: none

## 2021-10-12 ENCOUNTER — APPOINTMENT (OUTPATIENT)
Dept: PHYSICAL THERAPY | Facility: HOSPITAL | Age: 76
End: 2021-10-12
Attending: ORTHOPAEDIC SURGERY
Payer: MEDICARE

## 2021-10-12 ENCOUNTER — APPOINTMENT (OUTPATIENT)
Dept: PHYSICAL THERAPY | Age: 76
End: 2021-10-12
Attending: ORTHOPAEDIC SURGERY
Payer: MEDICARE

## 2021-10-14 ENCOUNTER — APPOINTMENT (OUTPATIENT)
Dept: PHYSICAL THERAPY | Facility: HOSPITAL | Age: 76
End: 2021-10-14
Attending: ORTHOPAEDIC SURGERY
Payer: MEDICARE

## 2021-10-15 ENCOUNTER — APPOINTMENT (OUTPATIENT)
Dept: PHYSICAL THERAPY | Age: 76
End: 2021-10-15
Attending: ORTHOPAEDIC SURGERY
Payer: MEDICARE

## 2021-10-18 ENCOUNTER — OFFICE VISIT (OUTPATIENT)
Dept: INTERNAL MEDICINE CLINIC | Facility: CLINIC | Age: 76
End: 2021-10-18
Payer: MEDICARE

## 2021-10-18 VITALS
HEART RATE: 65 BPM | OXYGEN SATURATION: 95 % | DIASTOLIC BLOOD PRESSURE: 60 MMHG | SYSTOLIC BLOOD PRESSURE: 100 MMHG | HEIGHT: 71 IN | BODY MASS INDEX: 31.36 KG/M2 | WEIGHT: 224 LBS

## 2021-10-18 DIAGNOSIS — R53.83 OTHER FATIGUE: Primary | ICD-10-CM

## 2021-10-18 DIAGNOSIS — H00.011 HORDEOLUM EXTERNUM OF RIGHT UPPER EYELID: ICD-10-CM

## 2021-10-18 PROCEDURE — 3074F SYST BP LT 130 MM HG: CPT | Performed by: INTERNAL MEDICINE

## 2021-10-18 PROCEDURE — 3008F BODY MASS INDEX DOCD: CPT | Performed by: INTERNAL MEDICINE

## 2021-10-18 PROCEDURE — 3078F DIAST BP <80 MM HG: CPT | Performed by: INTERNAL MEDICINE

## 2021-10-18 PROCEDURE — 99213 OFFICE O/P EST LOW 20 MIN: CPT | Performed by: INTERNAL MEDICINE

## 2021-10-18 RX ORDER — ERYTHROMYCIN 5 MG/G
1 OINTMENT OPHTHALMIC NIGHTLY
Qty: 1 EACH | Refills: 1 | Status: SHIPPED | OUTPATIENT
Start: 2021-10-18

## 2021-10-18 RX ORDER — CEPHALEXIN 500 MG/1
500 CAPSULE ORAL 3 TIMES DAILY
Qty: 30 CAPSULE | Refills: 0 | Status: SHIPPED | OUTPATIENT
Start: 2021-10-18

## 2021-10-18 NOTE — PROGRESS NOTES
Subjective:   Patient ID: Trev Navas is a 66year old male. Eye Problem     Patient here for evaluation of some swelling and itch of the right eyelid area going on for two weeks. No therapy.     History/Other:   Review of Systems   Constitution T4 [E]      Meds This Visit:  Requested Prescriptions     Signed Prescriptions Disp Refills   • cephalexin 500 MG Oral Cap 30 capsule 0     Sig: Take 1 capsule (500 mg total) by mouth 3 (three) times daily.    • erythromycin 5 MG/GM Ophthalmic Ointment 1 ea

## 2021-10-19 ENCOUNTER — APPOINTMENT (OUTPATIENT)
Dept: PHYSICAL THERAPY | Age: 76
End: 2021-10-19
Attending: ORTHOPAEDIC SURGERY
Payer: MEDICARE

## 2021-10-19 ENCOUNTER — APPOINTMENT (OUTPATIENT)
Dept: PHYSICAL THERAPY | Facility: HOSPITAL | Age: 76
End: 2021-10-19
Attending: ORTHOPAEDIC SURGERY
Payer: MEDICARE

## 2021-10-21 ENCOUNTER — APPOINTMENT (OUTPATIENT)
Dept: PHYSICAL THERAPY | Facility: HOSPITAL | Age: 76
End: 2021-10-21
Attending: ORTHOPAEDIC SURGERY
Payer: MEDICARE

## 2021-10-22 ENCOUNTER — APPOINTMENT (OUTPATIENT)
Dept: PHYSICAL THERAPY | Age: 76
End: 2021-10-22
Attending: ORTHOPAEDIC SURGERY
Payer: MEDICARE

## 2021-10-26 ENCOUNTER — OFFICE VISIT (OUTPATIENT)
Dept: PHYSICAL THERAPY | Facility: HOSPITAL | Age: 76
End: 2021-10-26
Attending: INTERNAL MEDICINE
Payer: MEDICARE

## 2021-10-26 ENCOUNTER — APPOINTMENT (OUTPATIENT)
Dept: PHYSICAL THERAPY | Age: 76
End: 2021-10-26
Attending: ORTHOPAEDIC SURGERY
Payer: MEDICARE

## 2021-10-26 PROCEDURE — 97140 MANUAL THERAPY 1/> REGIONS: CPT

## 2021-10-26 PROCEDURE — 97110 THERAPEUTIC EXERCISES: CPT

## 2021-10-26 NOTE — PROGRESS NOTES
Discharge Summary  Pt has attended 4 visits in Physical Therapy    Dx: Closed nondisplaced fracture of head of right radius with routine healing, subsequent encounter (K89.058Z)  Insurance (Authorized # of Visits):  6 ([id number])  [id number] [address] Physician: [place]#: 5/ Date:    Tx#: 6/   MT  -intercarpal gliding  -Volar glides radial head  -distal radius on ulna gliding gr 3 MT  -intercarpal gliding  -Volar glides radial head  -distal radius on ulna gliding gr 3 MT  -intercarpal gliding  -Volar glides radi

## 2021-10-29 ENCOUNTER — APPOINTMENT (OUTPATIENT)
Dept: PHYSICAL THERAPY | Age: 76
End: 2021-10-29
Attending: ORTHOPAEDIC SURGERY
Payer: MEDICARE

## 2021-11-02 ENCOUNTER — APPOINTMENT (OUTPATIENT)
Dept: PHYSICAL THERAPY | Age: 76
End: 2021-11-02
Attending: ORTHOPAEDIC SURGERY
Payer: MEDICARE

## 2022-02-03 ENCOUNTER — TELEPHONE (OUTPATIENT)
Dept: INTERNAL MEDICINE CLINIC | Facility: CLINIC | Age: 77
End: 2022-02-03

## 2022-02-14 ENCOUNTER — TELEPHONE (OUTPATIENT)
Dept: INTERNAL MEDICINE CLINIC | Facility: CLINIC | Age: 77
End: 2022-02-14

## 2022-02-14 NOTE — TELEPHONE ENCOUNTER
Patient called as he has the same cough like he has had in past year's in December or January. Now he has the cough for about over a week. With a runny nose. No other symptoms. He's asking if Dr. Ciarra Day can please prescribe the same medication she has in the past for his cough.

## 2022-02-15 ENCOUNTER — TELEPHONE (OUTPATIENT)
Dept: INTERNAL MEDICINE CLINIC | Facility: CLINIC | Age: 77
End: 2022-02-15

## 2022-02-15 NOTE — TELEPHONE ENCOUNTER
Patient at Dr. Lesly Multani office. Was informed that he used up his approved visits and needs a new referral authorization. Informed patient that a new one should go through and hopefully doctor will see him. Will fax auth to provider and MyChart to patient once approved. Generated for 6 more visits.

## 2022-02-24 ENCOUNTER — OFFICE VISIT (OUTPATIENT)
Dept: INTERNAL MEDICINE CLINIC | Facility: CLINIC | Age: 77
End: 2022-02-24
Payer: MEDICARE

## 2022-02-24 VITALS
SYSTOLIC BLOOD PRESSURE: 120 MMHG | WEIGHT: 222 LBS | BODY MASS INDEX: 30.07 KG/M2 | DIASTOLIC BLOOD PRESSURE: 70 MMHG | HEART RATE: 67 BPM | OXYGEN SATURATION: 93 % | HEIGHT: 72 IN

## 2022-02-24 DIAGNOSIS — Z00.00 MEDICARE ANNUAL WELLNESS VISIT, SUBSEQUENT: Primary | ICD-10-CM

## 2022-02-24 DIAGNOSIS — J42 CHRONIC BRONCHITIS, UNSPECIFIED CHRONIC BRONCHITIS TYPE (HCC): ICD-10-CM

## 2022-02-24 DIAGNOSIS — I70.0 ATHEROSCLEROSIS OF AORTA (HCC): ICD-10-CM

## 2022-02-24 DIAGNOSIS — I51.5 CARDIAC CALCIFICATION (HCC): ICD-10-CM

## 2022-02-24 PROBLEM — E11.9 TYPE 2 DIABETES MELLITUS (HCC): Status: ACTIVE | Noted: 2022-02-24

## 2022-02-24 PROBLEM — E66.01 CLASS 2 SEVERE OBESITY DUE TO EXCESS CALORIES WITH SERIOUS COMORBIDITY IN ADULT, UNSPECIFIED BMI (HCC): Status: RESOLVED | Noted: 2020-06-12 | Resolved: 2022-02-24

## 2022-02-24 PROBLEM — J84.10 GRANULOMATOUS LUNG DISEASE (HCC): Status: RESOLVED | Noted: 2020-06-12 | Resolved: 2022-02-24

## 2022-02-24 PROBLEM — J84.9 INTERSTITIAL LUNG DISEASE (HCC): Status: RESOLVED | Noted: 2020-06-12 | Resolved: 2022-02-24

## 2022-02-24 PROBLEM — J47.9 BRONCHIECTASIS (HCC): Status: RESOLVED | Noted: 2020-06-12 | Resolved: 2022-02-24

## 2022-02-24 PROBLEM — J67.9 PNEUMONITIS, HYPERSENSITIVITY (HCC): Status: RESOLVED | Noted: 2020-06-12 | Resolved: 2022-02-24

## 2022-02-24 PROCEDURE — 3008F BODY MASS INDEX DOCD: CPT | Performed by: INTERNAL MEDICINE

## 2022-02-24 PROCEDURE — 3078F DIAST BP <80 MM HG: CPT | Performed by: INTERNAL MEDICINE

## 2022-02-24 PROCEDURE — 96160 PT-FOCUSED HLTH RISK ASSMT: CPT | Performed by: INTERNAL MEDICINE

## 2022-02-24 PROCEDURE — 99397 PER PM REEVAL EST PAT 65+ YR: CPT | Performed by: INTERNAL MEDICINE

## 2022-02-24 PROCEDURE — G0439 PPPS, SUBSEQ VISIT: HCPCS | Performed by: INTERNAL MEDICINE

## 2022-02-24 PROCEDURE — 3074F SYST BP LT 130 MM HG: CPT | Performed by: INTERNAL MEDICINE

## 2022-02-24 RX ORDER — DORZOLAMIDE HCL 20 MG/ML
1 SOLUTION/ DROPS OPHTHALMIC 2 TIMES DAILY
COMMUNITY
Start: 2022-02-15

## 2022-02-24 RX ORDER — LATANOPROST 50 UG/ML
1 SOLUTION/ DROPS OPHTHALMIC
COMMUNITY
Start: 2022-02-10

## 2022-02-25 LAB
ABSOLUTE BASOPHILS: 99 CELLS/UL (ref 0–200)
ABSOLUTE EOSINOPHILS: 521 CELLS/UL (ref 15–500)
ABSOLUTE LYMPHOCYTES: 4377 CELLS/UL (ref 850–3900)
ABSOLUTE MONOCYTES: 1327 CELLS/UL (ref 200–950)
ABSOLUTE NEUTROPHILS: 6076 CELLS/UL (ref 1500–7800)
ALBUMIN/GLOBULIN RATIO: 1.3 (CALC) (ref 1–2.5)
ALBUMIN: 3.9 G/DL (ref 3.6–5.1)
ALKALINE PHOSPHATASE: 76 U/L (ref 35–144)
ALT: 13 U/L (ref 9–46)
AST: 15 U/L (ref 10–35)
BASOPHILS: 0.8 %
BILIRUBIN, TOTAL: 1.3 MG/DL (ref 0.2–1.2)
BUN: 11 MG/DL (ref 7–25)
CALCIUM: 8.9 MG/DL (ref 8.6–10.3)
CARBON DIOXIDE: 29 MMOL/L (ref 20–32)
CHLORIDE: 103 MMOL/L (ref 98–110)
CHOL/HDLC RATIO: 6.1 (CALC)
CHOLESTEROL, TOTAL: 194 MG/DL
CREATININE: 0.85 MG/DL (ref 0.7–1.18)
EGFR IF AFRICN AM: 97 ML/MIN/1.73M2
EGFR IF NONAFRICN AM: 83 ML/MIN/1.73M2
EOSINOPHILS: 4.2 %
GLOBULIN: 3.1 G/DL (CALC) (ref 1.9–3.7)
GLUCOSE: 108 MG/DL (ref 65–99)
HDL CHOLESTEROL: 32 MG/DL
HEMATOCRIT: 47.8 % (ref 38.5–50)
HEMOGLOBIN: 16.1 G/DL (ref 13.2–17.1)
LDL-CHOLESTEROL: 130 MG/DL (CALC)
LYMPHOCYTES: 35.3 %
MCH: 29.8 PG (ref 27–33)
MCHC: 33.7 G/DL (ref 32–36)
MCV: 88.5 FL (ref 80–100)
MONOCYTES: 10.7 %
MPV: 9.8 FL (ref 7.5–12.5)
NEUTROPHILS: 49 %
NON-HDL CHOLESTEROL: 162 MG/DL (CALC)
PLATELET COUNT: 402 THOUSAND/UL (ref 140–400)
POTASSIUM: 4.4 MMOL/L (ref 3.5–5.3)
PROTEIN, TOTAL: 7 G/DL (ref 6.1–8.1)
RDW: 12.5 % (ref 11–15)
RED BLOOD CELL COUNT: 5.4 MILLION/UL (ref 4.2–5.8)
TRIGLYCERIDES: 184 MG/DL
WHITE BLOOD CELL COUNT: 12.4 THOUSAND/UL (ref 3.8–10.8)

## 2022-03-08 ENCOUNTER — APPOINTMENT (OUTPATIENT)
Dept: GENERAL RADIOLOGY | Age: 77
End: 2022-03-08
Attending: EMERGENCY MEDICINE
Payer: MEDICARE

## 2022-03-08 ENCOUNTER — HOSPITAL ENCOUNTER (OUTPATIENT)
Age: 77
Discharge: HOME | End: 2022-03-08
Attending: EMERGENCY MEDICINE
Payer: MEDICARE

## 2022-03-08 ENCOUNTER — TELEPHONE (OUTPATIENT)
Dept: INTERNAL MEDICINE CLINIC | Facility: CLINIC | Age: 77
End: 2022-03-08

## 2022-03-08 VITALS
HEART RATE: 76 BPM | RESPIRATION RATE: 18 BRPM | OXYGEN SATURATION: 97 % | SYSTOLIC BLOOD PRESSURE: 132 MMHG | DIASTOLIC BLOOD PRESSURE: 73 MMHG | TEMPERATURE: 98 F

## 2022-03-08 DIAGNOSIS — J18.9 COMMUNITY ACQUIRED PNEUMONIA OF LEFT LUNG, UNSPECIFIED PART OF LUNG: Primary | ICD-10-CM

## 2022-03-08 LAB — SARS-COV-2 RNA RESP QL NAA+PROBE: NOT DETECTED

## 2022-03-08 PROCEDURE — 99214 OFFICE O/P EST MOD 30 MIN: CPT

## 2022-03-08 PROCEDURE — 71046 X-RAY EXAM CHEST 2 VIEWS: CPT | Performed by: EMERGENCY MEDICINE

## 2022-03-08 RX ORDER — BENZONATATE 200 MG/1
200 CAPSULE ORAL 3 TIMES DAILY PRN
Qty: 21 CAPSULE | Refills: 0 | Status: SHIPPED | OUTPATIENT
Start: 2022-03-08 | End: 2022-03-09

## 2022-03-08 RX ORDER — LEVOFLOXACIN 750 MG/1
750 TABLET ORAL DAILY
Qty: 5 TABLET | Refills: 0 | Status: SHIPPED | OUTPATIENT
Start: 2022-03-08 | End: 2022-03-13

## 2022-03-08 NOTE — TELEPHONE ENCOUNTER
Month ago was given medrol pack. Started coughing again last week, sleeping a lot, SOB. Also has allergy to cat dander and there are 2 cats in the home. Advised to go to [facility].

## 2022-03-09 ENCOUNTER — TELEPHONE (OUTPATIENT)
Dept: INTERNAL MEDICINE CLINIC | Facility: CLINIC | Age: 77
End: 2022-03-09

## 2022-03-09 RX ORDER — BENZONATATE 200 MG/1
200 CAPSULE ORAL 3 TIMES DAILY PRN
Qty: 21 CAPSULE | Refills: 0 | Status: SHIPPED | OUTPATIENT
Start: 2022-03-09 | End: 2022-03-16

## 2022-03-09 NOTE — ED INITIAL ASSESSMENT (HPI)
Persistent cough for over a month. Given a steroid pack by Dr. Jl Amaya a couple weeks ago and seemed to improve but not resolve. Cough became worse again with SOB and fatigue for over a week. No fever. Denies chest pain but has soreness when coughing. Patient is unvaccinated for covid.

## 2022-03-09 NOTE — TELEPHONE ENCOUNTER
Patient went to [facility] last night as he wasn't feeling well. Coughing alot. He was up all night, as the medicine they gave him he thinks is giving him a reaction.     Also, he's concerned as the sheet he gave him shows he has a number of things wrong and he said he just had his physical.

## 2022-03-09 NOTE — TELEPHONE ENCOUNTER
Made appointment for [facility] follow-up for Monday 3/14. Pneumonia and possible bronchitis. Also has questions about why chart lists him having Diabetes along with a few others that he was not aware of. Had a sleepless night after taking Abx and Mucinex. Did not purchase tessalon because insurance doesn't cover it and it cost over $30.00, hence the Mucinex. Gave patient information about Good Rx and the cost would be a little over $6.00. He will purchase using the GoodRx coupon. Sent message to PCP for approval to send script to [facility].  Waiting reply.

## 2022-03-31 ENCOUNTER — OFFICE VISIT (OUTPATIENT)
Dept: INTERNAL MEDICINE CLINIC | Facility: CLINIC | Age: 77
End: 2022-03-31
Payer: MEDICARE

## 2022-03-31 VITALS
OXYGEN SATURATION: 93 % | SYSTOLIC BLOOD PRESSURE: 130 MMHG | HEART RATE: 55 BPM | WEIGHT: 223 LBS | BODY MASS INDEX: 30.2 KG/M2 | HEIGHT: 72 IN | DIASTOLIC BLOOD PRESSURE: 70 MMHG

## 2022-03-31 DIAGNOSIS — R06.00 DYSPNEA ON EXERTION: ICD-10-CM

## 2022-03-31 DIAGNOSIS — J84.9 INTERSTITIAL LUNG DISEASE (HCC): Primary | ICD-10-CM

## 2022-03-31 PROBLEM — J67.9 HYPERSENSITIVITY PNEUMONITIS (HCC): Status: ACTIVE | Noted: 2020-03-25

## 2022-03-31 PROCEDURE — 99214 OFFICE O/P EST MOD 30 MIN: CPT | Performed by: INTERNAL MEDICINE

## 2022-03-31 PROCEDURE — 3075F SYST BP GE 130 - 139MM HG: CPT | Performed by: INTERNAL MEDICINE

## 2022-03-31 PROCEDURE — 3078F DIAST BP <80 MM HG: CPT | Performed by: INTERNAL MEDICINE

## 2022-03-31 PROCEDURE — 3008F BODY MASS INDEX DOCD: CPT | Performed by: INTERNAL MEDICINE

## 2022-03-31 RX ORDER — FLUTICASONE PROPIONATE AND SALMETEROL 113; 14 UG/1; UG/1
2 POWDER, METERED RESPIRATORY (INHALATION) 2 TIMES DAILY
Qty: 1 EACH | Refills: 3 | Status: SHIPPED | OUTPATIENT
Start: 2022-03-31 | End: 2022-04-01

## 2022-04-01 RX ORDER — FLUTICASONE FUROATE AND VILANTEROL TRIFENATATE 100; 25 UG/1; UG/1
1 POWDER RESPIRATORY (INHALATION) DAILY
Qty: 1 EACH | Refills: 5 | Status: SHIPPED | OUTPATIENT
Start: 2022-04-01

## 2022-05-09 ENCOUNTER — HOSPITAL ENCOUNTER (OUTPATIENT)
Dept: CT IMAGING | Age: 77
Discharge: HOME | End: 2022-05-09
Attending: INTERNAL MEDICINE
Payer: MEDICARE

## 2022-05-09 DIAGNOSIS — J84.9 INTERSTITIAL LUNG DISEASE (HCC): ICD-10-CM

## 2022-05-09 DIAGNOSIS — J67.9 PNEUMONITIS, HYPERSENSITIVITY (HCC): ICD-10-CM

## 2022-05-09 DIAGNOSIS — R06.00 DYSPNEA: ICD-10-CM

## 2022-05-09 DIAGNOSIS — R94.2 ABNORMAL PFT: ICD-10-CM

## 2022-05-09 PROCEDURE — 71250 CT THORAX DX C-: CPT | Performed by: INTERNAL MEDICINE

## 2022-06-13 DIAGNOSIS — F51.01 PRIMARY INSOMNIA: ICD-10-CM

## 2022-06-13 RX ORDER — ZOLPIDEM TARTRATE 5 MG/1
5 TABLET ORAL DAILY
Qty: 30 TABLET | Refills: 0 | Status: SHIPPED | OUTPATIENT
Start: 2022-06-13

## 2022-06-13 RX ORDER — CLOTRIMAZOLE AND BETAMETHASONE DIPROPIONATE 10; .64 MG/G; MG/G
CREAM TOPICAL
Qty: 60 G | Refills: 3 | Status: SHIPPED | OUTPATIENT
Start: 2022-06-13

## 2022-06-13 NOTE — TELEPHONE ENCOUNTER
Patient called for refills -    Clotrimazole - betamethasone 1-0.05 % External Cream    Zolpidem 5 MG Oral Tab

## 2022-08-01 ENCOUNTER — TELEPHONE (OUTPATIENT)
Dept: INTERNAL MEDICINE CLINIC | Facility: CLINIC | Age: 77
End: 2022-08-01

## 2022-08-01 DIAGNOSIS — Z01.00 ROUTINE EYE EXAM: Primary | ICD-10-CM

## 2022-08-01 NOTE — TELEPHONE ENCOUNTER
LVM:  Requesting order/referral to see Dr. Giancarlo Damon at the [facility].   Fax to: Hermelinda Antoine  [phone]

## 2022-09-01 ENCOUNTER — OFFICE VISIT (OUTPATIENT)
Dept: INTERNAL MEDICINE CLINIC | Facility: CLINIC | Age: 77
End: 2022-09-01
Payer: MEDICARE

## 2022-09-01 VITALS
BODY MASS INDEX: 29.8 KG/M2 | HEIGHT: 72 IN | DIASTOLIC BLOOD PRESSURE: 60 MMHG | WEIGHT: 220 LBS | SYSTOLIC BLOOD PRESSURE: 120 MMHG

## 2022-09-01 DIAGNOSIS — H35.30 MACULAR DEGENERATION OF LEFT EYE, UNSPECIFIED TYPE: ICD-10-CM

## 2022-09-01 DIAGNOSIS — B35.3 TINEA PEDIS OF RIGHT FOOT: ICD-10-CM

## 2022-09-01 DIAGNOSIS — S39.012A STRAIN OF LUMBAR REGION, INITIAL ENCOUNTER: Primary | ICD-10-CM

## 2022-09-01 PROCEDURE — 3008F BODY MASS INDEX DOCD: CPT | Performed by: INTERNAL MEDICINE

## 2022-09-01 PROCEDURE — 3078F DIAST BP <80 MM HG: CPT | Performed by: INTERNAL MEDICINE

## 2022-09-01 PROCEDURE — 3074F SYST BP LT 130 MM HG: CPT | Performed by: INTERNAL MEDICINE

## 2022-09-01 PROCEDURE — 99214 OFFICE O/P EST MOD 30 MIN: CPT | Performed by: INTERNAL MEDICINE

## 2022-09-01 RX ORDER — PRENATAL VIT 91/IRON/FOLIC/DHA 28-975-200
COMBINATION PACKAGE (EA) ORAL
Qty: 42 G | Refills: 0 | Status: SHIPPED | OUTPATIENT
Start: 2022-09-01

## 2022-09-07 ENCOUNTER — TELEPHONE (OUTPATIENT)
Dept: INTERNAL MEDICINE CLINIC | Facility: CLINIC | Age: 77
End: 2022-09-07

## 2022-09-30 ENCOUNTER — APPOINTMENT (OUTPATIENT)
Dept: GENERAL RADIOLOGY | Age: 77
End: 2022-09-30
Attending: PHYSICIAN ASSISTANT
Payer: MEDICARE

## 2022-09-30 ENCOUNTER — HOSPITAL ENCOUNTER (OUTPATIENT)
Age: 77
Discharge: HOME | End: 2022-09-30
Payer: MEDICARE

## 2022-09-30 VITALS
OXYGEN SATURATION: 95 % | DIASTOLIC BLOOD PRESSURE: 78 MMHG | HEART RATE: 58 BPM | RESPIRATION RATE: 18 BRPM | TEMPERATURE: 98 F | SYSTOLIC BLOOD PRESSURE: 140 MMHG

## 2022-09-30 DIAGNOSIS — M25.552 LEFT HIP PAIN: Primary | ICD-10-CM

## 2022-09-30 PROCEDURE — 99214 OFFICE O/P EST MOD 30 MIN: CPT

## 2022-09-30 PROCEDURE — 99213 OFFICE O/P EST LOW 20 MIN: CPT

## 2022-09-30 PROCEDURE — 73502 X-RAY EXAM HIP UNI 2-3 VIEWS: CPT | Performed by: PHYSICIAN ASSISTANT

## 2022-09-30 PROCEDURE — 72100 X-RAY EXAM L-S SPINE 2/3 VWS: CPT | Performed by: PHYSICIAN ASSISTANT

## 2022-09-30 NOTE — ED INITIAL ASSESSMENT (HPI)
PATIENT ARRIVED AMBULATORY TO ROOM C/O LEFT HIP PAIN. PAIN STARTED 1 WEEK AGO. PATIENT DENIES INJURY. PAIN NON RADIATING.

## 2022-10-21 ENCOUNTER — OFFICE VISIT (OUTPATIENT)
Dept: INTERNAL MEDICINE CLINIC | Facility: CLINIC | Age: 77
End: 2022-10-21
Payer: MEDICARE

## 2022-10-21 VITALS
BODY MASS INDEX: 29.66 KG/M2 | WEIGHT: 219 LBS | DIASTOLIC BLOOD PRESSURE: 78 MMHG | HEIGHT: 72 IN | OXYGEN SATURATION: 97 % | SYSTOLIC BLOOD PRESSURE: 132 MMHG | HEART RATE: 57 BPM

## 2022-10-21 DIAGNOSIS — M70.62 TROCHANTERIC BURSITIS OF LEFT HIP: Primary | ICD-10-CM

## 2022-10-21 DIAGNOSIS — H00.011 HORDEOLUM EXTERNUM OF RIGHT UPPER EYELID: ICD-10-CM

## 2022-10-21 PROCEDURE — 20605 DRAIN/INJ JOINT/BURSA W/O US: CPT | Performed by: INTERNAL MEDICINE

## 2022-10-21 PROCEDURE — 3008F BODY MASS INDEX DOCD: CPT | Performed by: INTERNAL MEDICINE

## 2022-10-21 PROCEDURE — 99213 OFFICE O/P EST LOW 20 MIN: CPT | Performed by: INTERNAL MEDICINE

## 2022-10-21 PROCEDURE — 3075F SYST BP GE 130 - 139MM HG: CPT | Performed by: INTERNAL MEDICINE

## 2022-10-21 PROCEDURE — 3078F DIAST BP <80 MM HG: CPT | Performed by: INTERNAL MEDICINE

## 2022-10-21 RX ORDER — DICLOFENAC SODIUM 75 MG/1
75 TABLET, DELAYED RELEASE ORAL 2 TIMES DAILY
Qty: 30 TABLET | Refills: 2 | Status: SHIPPED | OUTPATIENT
Start: 2022-10-21

## 2022-10-21 RX ORDER — METHYLPREDNISOLONE ACETATE 40 MG/ML
40 INJECTION, SUSPENSION INTRA-ARTICULAR; INTRALESIONAL; INTRAMUSCULAR; SOFT TISSUE ONCE
Status: SHIPPED | OUTPATIENT
Start: 2022-10-21

## 2022-10-21 RX ORDER — AMOXICILLIN 500 MG/1
CAPSULE ORAL
COMMUNITY
Start: 2022-10-20

## 2022-12-06 ENCOUNTER — TELEPHONE (OUTPATIENT)
Dept: INTERNAL MEDICINE CLINIC | Facility: CLINIC | Age: 77
End: 2022-12-06

## 2022-12-06 NOTE — TELEPHONE ENCOUNTER
Patient called and left a voicemail, said he has been sick for 10 days. Thinks he might have a touch of pneumonia again. Lots of coughing. And he has lost 10 pounds.

## 2022-12-14 ENCOUNTER — MED REC SCAN ONLY (OUTPATIENT)
Dept: INTERNAL MEDICINE CLINIC | Facility: CLINIC | Age: 77
End: 2022-12-14

## 2022-12-20 ENCOUNTER — HOSPITAL ENCOUNTER (OUTPATIENT)
Age: 77
Discharge: HOME | End: 2022-12-20
Payer: MEDICARE

## 2022-12-20 ENCOUNTER — HOSPITAL ENCOUNTER (OUTPATIENT)
Dept: CT IMAGING | Age: 77
Discharge: HOME | End: 2022-12-20
Attending: NURSE PRACTITIONER
Payer: MEDICARE

## 2022-12-20 ENCOUNTER — APPOINTMENT (OUTPATIENT)
Dept: GENERAL RADIOLOGY | Age: 77
End: 2022-12-20
Attending: NURSE PRACTITIONER
Payer: MEDICARE

## 2022-12-20 VITALS
TEMPERATURE: 98 F | DIASTOLIC BLOOD PRESSURE: 67 MMHG | OXYGEN SATURATION: 94 % | SYSTOLIC BLOOD PRESSURE: 114 MMHG | RESPIRATION RATE: 28 BRPM | HEART RATE: 72 BPM

## 2022-12-20 DIAGNOSIS — R91.8 GROUND GLASS OPACITY PRESENT ON IMAGING OF LUNG: ICD-10-CM

## 2022-12-20 DIAGNOSIS — R05.8 PRODUCTIVE COUGH: Primary | ICD-10-CM

## 2022-12-20 LAB
#MXD IC: 1.9 X10ˆ3/UL (ref 0.1–1)
BUN BLD-MCNC: 9 MG/DL (ref 7–18)
CHLORIDE BLD-SCNC: 103 MMOL/L (ref 98–112)
CO2 BLD-SCNC: 27 MMOL/L (ref 21–32)
CREAT BLD-MCNC: 0.8 MG/DL
DDIMER WHOLE BLOOD: <200 NG/ML DDU (ref ?–400)
GFR SERPLBLD BASED ON 1.73 SQ M-ARVRAT: 90 ML/MIN/1.73M2 (ref 60–?)
GLUCOSE BLD-MCNC: 96 MG/DL (ref 70–99)
HCT VFR BLD AUTO: 47.5 %
HCT VFR BLD CALC: 49 %
HGB BLD-MCNC: 15.1 G/DL
ISTAT IONIZED CALCIUM FOR CHEM 8: 1.19 MMOL/L (ref 1.12–1.32)
LYMPHOCYTES # BLD AUTO: 3.6 X10ˆ3/UL (ref 1–4)
LYMPHOCYTES NFR BLD AUTO: 33.1 %
MCH RBC QN AUTO: 29.1 PG (ref 26–34)
MCHC RBC AUTO-ENTMCNC: 31.8 G/DL (ref 31–37)
MCV RBC AUTO: 91.5 FL (ref 80–100)
MIXED CELL %: 17.5 %
NEUTROPHILS # BLD AUTO: 5.4 X10ˆ3/UL (ref 1.5–7.7)
NEUTROPHILS NFR BLD AUTO: 49.4 %
PLATELET # BLD AUTO: 332 X10ˆ3/UL (ref 150–450)
POTASSIUM BLD-SCNC: 4 MMOL/L (ref 3.6–5.1)
RBC # BLD AUTO: 5.19 X10ˆ6/UL
SARS-COV-2 RNA RESP QL NAA+PROBE: NOT DETECTED
SODIUM BLD-SCNC: 141 MMOL/L (ref 136–145)
TROPONIN I BLD-MCNC: <0.02 NG/ML
WBC # BLD AUTO: 10.9 X10ˆ3/UL (ref 4–11)

## 2022-12-20 PROCEDURE — 85025 COMPLETE CBC W/AUTO DIFF WBC: CPT | Performed by: NURSE PRACTITIONER

## 2022-12-20 PROCEDURE — 93005 ELECTROCARDIOGRAM TRACING: CPT

## 2022-12-20 PROCEDURE — 93010 ELECTROCARDIOGRAM REPORT: CPT

## 2022-12-20 PROCEDURE — 84484 ASSAY OF TROPONIN QUANT: CPT

## 2022-12-20 PROCEDURE — 85378 FIBRIN DEGRADE SEMIQUANT: CPT | Performed by: NURSE PRACTITIONER

## 2022-12-20 PROCEDURE — 94640 AIRWAY INHALATION TREATMENT: CPT

## 2022-12-20 PROCEDURE — 80047 BASIC METABLC PNL IONIZED CA: CPT

## 2022-12-20 PROCEDURE — 99215 OFFICE O/P EST HI 40 MIN: CPT

## 2022-12-20 PROCEDURE — 36415 COLL VENOUS BLD VENIPUNCTURE: CPT

## 2022-12-20 PROCEDURE — 99214 OFFICE O/P EST MOD 30 MIN: CPT

## 2022-12-20 PROCEDURE — 71046 X-RAY EXAM CHEST 2 VIEWS: CPT | Performed by: NURSE PRACTITIONER

## 2022-12-20 PROCEDURE — 71260 CT THORAX DX C+: CPT | Performed by: NURSE PRACTITIONER

## 2022-12-20 RX ORDER — ALBUTEROL SULFATE 2.5 MG/3ML
2.5 SOLUTION RESPIRATORY (INHALATION) ONCE
Status: COMPLETED | OUTPATIENT
Start: 2022-12-20 | End: 2022-12-20

## 2022-12-20 RX ORDER — PREDNISONE 20 MG/1
40 TABLET ORAL ONCE
Status: COMPLETED | OUTPATIENT
Start: 2022-12-20 | End: 2022-12-20

## 2022-12-20 RX ORDER — DOXYCYCLINE HYCLATE 100 MG/1
100 CAPSULE ORAL 2 TIMES DAILY
Qty: 14 CAPSULE | Refills: 0 | Status: SHIPPED | OUTPATIENT
Start: 2022-12-20 | End: 2022-12-27

## 2022-12-20 RX ORDER — PREDNISONE 20 MG/1
40 TABLET ORAL DAILY
Qty: 10 TABLET | Refills: 0 | Status: SHIPPED | OUTPATIENT
Start: 2022-12-20 | End: 2022-12-25

## 2022-12-20 NOTE — ED INITIAL ASSESSMENT (HPI)
Presents with persistent cough since Thanksgiving. Becoming more SOB with time. Denies chest pain. No fever. States poor appetite with 12 lb weight loss.

## 2022-12-21 LAB
ATRIAL RATE: 55 BPM
P AXIS: 6 DEGREES
P-R INTERVAL: 154 MS
Q-T INTERVAL: 440 MS
QRS DURATION: 80 MS
QTC CALCULATION (BEZET): 420 MS
R AXIS: -1 DEGREES
T AXIS: 5 DEGREES
VENTRICULAR RATE: 55 BPM

## 2022-12-21 NOTE — DISCHARGE INSTRUCTIONS
Use your inhaler as prescribed  Monitor for any new or worsening symptoms  Go to the emergency department for difficulty breathing or chest pain

## 2023-01-12 DIAGNOSIS — F51.01 PRIMARY INSOMNIA: ICD-10-CM

## 2023-01-12 RX ORDER — ZOLPIDEM TARTRATE 5 MG/1
5 TABLET ORAL DAILY
Qty: 30 TABLET | Refills: 0 | Status: CANCELLED | OUTPATIENT
Start: 2023-01-12

## 2023-01-12 NOTE — TELEPHONE ENCOUNTER
Patient called for a refill - Zolpidem 5 MG Oral Tab medication. To be filled at the [facility] in [place].

## 2023-01-27 DIAGNOSIS — H35.81 MACULAR RETINAL EDEMA: Primary | ICD-10-CM

## 2023-02-08 ENCOUNTER — MED REC SCAN ONLY (OUTPATIENT)
Dept: INTERNAL MEDICINE CLINIC | Facility: CLINIC | Age: 78
End: 2023-02-08

## 2023-03-16 ENCOUNTER — OFFICE VISIT (OUTPATIENT)
Dept: INTERNAL MEDICINE CLINIC | Facility: CLINIC | Age: 78
End: 2023-03-16
Payer: MEDICARE

## 2023-03-16 VITALS
SYSTOLIC BLOOD PRESSURE: 120 MMHG | HEIGHT: 72 IN | DIASTOLIC BLOOD PRESSURE: 70 MMHG | BODY MASS INDEX: 29.39 KG/M2 | WEIGHT: 217 LBS

## 2023-03-16 DIAGNOSIS — Z00.00 MEDICARE ANNUAL WELLNESS VISIT, SUBSEQUENT: Primary | ICD-10-CM

## 2023-03-16 DIAGNOSIS — J84.9 INTERSTITIAL LUNG DISEASE (HCC): ICD-10-CM

## 2023-03-16 DIAGNOSIS — H02.402 PTOSIS OF LEFT EYELID: ICD-10-CM

## 2023-03-16 DIAGNOSIS — H35.30 MACULAR DEGENERATION OF LEFT EYE, UNSPECIFIED TYPE: ICD-10-CM

## 2023-03-16 PROCEDURE — 99397 PER PM REEVAL EST PAT 65+ YR: CPT | Performed by: INTERNAL MEDICINE

## 2023-03-16 PROCEDURE — 3008F BODY MASS INDEX DOCD: CPT | Performed by: INTERNAL MEDICINE

## 2023-03-16 PROCEDURE — 3074F SYST BP LT 130 MM HG: CPT | Performed by: INTERNAL MEDICINE

## 2023-03-16 PROCEDURE — 96160 PT-FOCUSED HLTH RISK ASSMT: CPT | Performed by: INTERNAL MEDICINE

## 2023-03-16 PROCEDURE — 1126F AMNT PAIN NOTED NONE PRSNT: CPT | Performed by: INTERNAL MEDICINE

## 2023-03-16 PROCEDURE — G0439 PPPS, SUBSEQ VISIT: HCPCS | Performed by: INTERNAL MEDICINE

## 2023-03-16 PROCEDURE — 3078F DIAST BP <80 MM HG: CPT | Performed by: INTERNAL MEDICINE

## 2023-03-16 RX ORDER — FLUTICASONE PROPIONATE AND SALMETEROL 250; 50 UG/1; UG/1
1 POWDER RESPIRATORY (INHALATION) 2 TIMES DAILY
COMMUNITY
Start: 2023-03-13

## 2023-03-16 RX ORDER — FLUTICASONE PROPIONATE AND SALMETEROL 250; 50 UG/1; UG/1
POWDER RESPIRATORY (INHALATION)
COMMUNITY
Start: 2023-03-14

## 2023-03-17 ENCOUNTER — TELEPHONE (OUTPATIENT)
Dept: INTERNAL MEDICINE CLINIC | Facility: CLINIC | Age: 78
End: 2023-03-17

## 2023-03-17 RX ORDER — ZOLPIDEM TARTRATE 10 MG/1
10 TABLET ORAL NIGHTLY
Qty: 90 TABLET | Refills: 3 | Status: SHIPPED | OUTPATIENT
Start: 2023-03-17 | End: 2024-03-11

## 2023-03-17 NOTE — TELEPHONE ENCOUNTER
Pt. Requesting refill for zolpidem 5 MG Oral Tab    Please send request-  to [facility]  [address], [phone], [phone]

## 2023-03-28 ENCOUNTER — TELEPHONE (OUTPATIENT)
Dept: INTERNAL MEDICINE CLINIC | Facility: CLINIC | Age: 78
End: 2023-03-28

## 2023-03-28 NOTE — TELEPHONE ENCOUNTER
Patient called. as a week ago he had blood work drawn at [facility].    He is calling for the results of his lab work.     Phone # [phone]

## 2023-03-28 NOTE — TELEPHONE ENCOUNTER
Pt is aware lab test are still pending at [facility].  Informed pt Dr. Ciarra Day will call pt as soon as result are final.

## 2023-04-01 LAB
ABSOLUTE BASOPHILS: 101 CELLS/UL (ref 0–200)
ABSOLUTE EOSINOPHILS: 737 CELLS/UL (ref 15–500)
ABSOLUTE LYMPHOCYTES: 3212 CELLS/UL (ref 850–3900)
ABSOLUTE MONOCYTES: 939 CELLS/UL (ref 200–950)
ABSOLUTE NEUTROPHILS: 5111 CELLS/UL (ref 1500–7800)
ACETYLCHOLINE RECEPTOR$BINDING ANTIBODY: <0.3 NMOL/L
ALBUMIN/GLOBULIN RATIO: 1.3 (CALC) (ref 1–2.5)
ALBUMIN: 4.1 G/DL (ref 3.6–5.1)
ALKALINE PHOSPHATASE: 67 U/L (ref 35–144)
ALT: 10 U/L (ref 9–46)
AST: 14 U/L (ref 10–35)
BASOPHILS: 1 %
BILIRUBIN, TOTAL: 1.1 MG/DL (ref 0.2–1.2)
BUN: 8 MG/DL (ref 7–25)
CALCIUM: 9.1 MG/DL (ref 8.6–10.3)
CARBON DIOXIDE: 28 MMOL/L (ref 20–32)
CHLORIDE: 105 MMOL/L (ref 98–110)
CHOL/HDLC RATIO: 6.1 (CALC)
CHOLESTEROL, TOTAL: 196 MG/DL
CREATININE: 0.72 MG/DL (ref 0.7–1.28)
EGFR: 93 ML/MIN/1.73M2
EOSINOPHILS: 7.3 %
GLOBULIN: 3.1 G/DL (CALC) (ref 1.9–3.7)
GLUCOSE: 107 MG/DL (ref 65–99)
HDL CHOLESTEROL: 32 MG/DL
HEMATOCRIT: 48.8 % (ref 38.5–50)
HEMOGLOBIN: 16.2 G/DL (ref 13.2–17.1)
LDL-CHOLESTEROL: 130 MG/DL (CALC)
LYMPHOCYTES: 31.8 %
MCH: 29.7 PG (ref 27–33)
MCHC: 33.2 G/DL (ref 32–36)
MCV: 89.4 FL (ref 80–100)
MONOCYTES: 9.3 %
MPV: 9.9 FL (ref 7.5–12.5)
NEUTROPHILS: 50.6 %
NON-HDL CHOLESTEROL: 164 MG/DL (CALC)
PLATELET COUNT: 311 THOUSAND/UL (ref 140–400)
POTASSIUM: 4.3 MMOL/L (ref 3.5–5.3)
PROTEIN, TOTAL: 7.2 G/DL (ref 6.1–8.1)
PSA, TOTAL: 3.81 NG/ML
RDW: 12.2 % (ref 11–15)
RED BLOOD CELL COUNT: 5.46 MILLION/UL (ref 4.2–5.8)
SODIUM: 141 MMOL/L (ref 135–146)
STRIATIONAL (STRIATED MSCL) AB: NEGATIVE
TRIGLYCERIDES: 206 MG/DL
WHITE BLOOD CELL COUNT: 10.1 THOUSAND/UL (ref 3.8–10.8)

## 2023-04-06 ENCOUNTER — TELEPHONE (OUTPATIENT)
Dept: INTERNAL MEDICINE CLINIC | Facility: CLINIC | Age: 78
End: 2023-04-06

## 2023-04-06 NOTE — TELEPHONE ENCOUNTER
Patient called as Dr. Kandice Garcia office hasn't received the referral for him to schedule his appointment. Fax number [phone]. Please contact patient once referral has been sent.

## 2023-04-10 NOTE — TELEPHONE ENCOUNTER
Patient is calling in stating  office states they do not see that Ching Martínez has approved referral.    Please call to advise.

## 2023-05-05 ENCOUNTER — TELEPHONE (OUTPATIENT)
Dept: INTERNAL MEDICINE CLINIC | Facility: CLINIC | Age: 78
End: 2023-05-05

## 2023-05-05 DIAGNOSIS — H35.30 MACULAR DEGENERATION OF LEFT EYE, UNSPECIFIED TYPE: Primary | ICD-10-CM

## 2023-05-16 ENCOUNTER — HOSPITAL ENCOUNTER (OUTPATIENT)
Age: 78
Discharge: HOME | End: 2023-05-16
Payer: MEDICARE

## 2023-05-16 VITALS
DIASTOLIC BLOOD PRESSURE: 68 MMHG | HEART RATE: 64 BPM | RESPIRATION RATE: 18 BRPM | OXYGEN SATURATION: 95 % | TEMPERATURE: 97 F | SYSTOLIC BLOOD PRESSURE: 118 MMHG

## 2023-05-16 DIAGNOSIS — J42 CHRONIC BRONCHITIS, UNSPECIFIED CHRONIC BRONCHITIS TYPE (HCC): Primary | ICD-10-CM

## 2023-05-16 PROCEDURE — 99213 OFFICE O/P EST LOW 20 MIN: CPT

## 2023-05-16 PROCEDURE — 99214 OFFICE O/P EST MOD 30 MIN: CPT

## 2023-05-16 RX ORDER — PREDNISONE 20 MG/1
40 TABLET ORAL DAILY
Qty: 10 TABLET | Refills: 0 | Status: SHIPPED | OUTPATIENT
Start: 2023-05-16 | End: 2023-05-21

## 2023-05-16 NOTE — DISCHARGE INSTRUCTIONS
Continue current medication regimen  For any new or worsening symptoms return to the IC or go to the ER

## 2023-06-27 ENCOUNTER — TELEPHONE (OUTPATIENT)
Dept: INTERNAL MEDICINE CLINIC | Facility: CLINIC | Age: 78
End: 2023-06-27

## 2023-08-17 ENCOUNTER — TELEPHONE (OUTPATIENT)
Dept: INTERNAL MEDICINE CLINIC | Facility: CLINIC | Age: 78
End: 2023-08-17

## 2023-08-17 NOTE — TELEPHONE ENCOUNTER
Patient called as he was just notified this afternoon that he needs a referral for his appointment tomorrow, 8/18 at the [facility]. The appointment is at 9:40 am.    [facility] phone # [phone]. He has a drooping eye lid and a red spot in his eye.

## 2023-09-05 ENCOUNTER — APPOINTMENT (OUTPATIENT)
Dept: GENERAL RADIOLOGY | Age: 78
End: 2023-09-05
Attending: PHYSICIAN ASSISTANT
Payer: MEDICARE

## 2023-09-05 ENCOUNTER — APPOINTMENT (OUTPATIENT)
Dept: ULTRASOUND IMAGING | Age: 78
End: 2023-09-05
Attending: PHYSICIAN ASSISTANT
Payer: MEDICARE

## 2023-09-05 ENCOUNTER — HOSPITAL ENCOUNTER (OUTPATIENT)
Age: 78
Discharge: HOME | End: 2023-09-05
Payer: MEDICARE

## 2023-09-05 VITALS
WEIGHT: 212 LBS | SYSTOLIC BLOOD PRESSURE: 120 MMHG | BODY MASS INDEX: 29.03 KG/M2 | HEART RATE: 59 BPM | RESPIRATION RATE: 18 BRPM | HEIGHT: 71.5 IN | DIASTOLIC BLOOD PRESSURE: 71 MMHG | TEMPERATURE: 97 F | OXYGEN SATURATION: 95 %

## 2023-09-05 DIAGNOSIS — M25.562 ACUTE PAIN OF LEFT KNEE: ICD-10-CM

## 2023-09-05 DIAGNOSIS — M79.672 LEFT FOOT PAIN: Primary | ICD-10-CM

## 2023-09-05 DIAGNOSIS — M79.622 LEFT UPPER ARM PAIN: ICD-10-CM

## 2023-09-05 PROCEDURE — 99214 OFFICE O/P EST MOD 30 MIN: CPT

## 2023-09-05 PROCEDURE — 73630 X-RAY EXAM OF FOOT: CPT | Performed by: PHYSICIAN ASSISTANT

## 2023-09-05 PROCEDURE — 73060 X-RAY EXAM OF HUMERUS: CPT | Performed by: PHYSICIAN ASSISTANT

## 2023-09-05 PROCEDURE — 73560 X-RAY EXAM OF KNEE 1 OR 2: CPT | Performed by: PHYSICIAN ASSISTANT

## 2023-09-05 PROCEDURE — 93971 EXTREMITY STUDY: CPT | Performed by: PHYSICIAN ASSISTANT

## 2023-09-05 NOTE — ED INITIAL ASSESSMENT (HPI)
Pt presents to the IC with c/o pain to the left foot, posterior knee, upper arm and flank for the last 4-6 weeks. Pt denies injury or trauma. Currently in pulmonary rehab d/t a cat allergy. Denies weakness.

## 2023-09-11 ENCOUNTER — OFFICE VISIT (OUTPATIENT)
Dept: PODIATRY CLINIC | Facility: CLINIC | Age: 78
End: 2023-09-11

## 2023-09-11 DIAGNOSIS — M79.672 LEFT FOOT PAIN: Primary | ICD-10-CM

## 2023-09-11 DIAGNOSIS — M76.72 PERONEAL TENDONITIS, LEFT: ICD-10-CM

## 2023-09-11 DIAGNOSIS — M77.52 BURSITIS OF LEFT FOOT: ICD-10-CM

## 2023-09-11 DIAGNOSIS — B35.3 ATHLETE'S FOOT, LEFT: ICD-10-CM

## 2023-09-11 PROCEDURE — 99204 OFFICE O/P NEW MOD 45 MIN: CPT | Performed by: PODIATRIST

## 2023-09-11 PROCEDURE — 1125F AMNT PAIN NOTED PAIN PRSNT: CPT | Performed by: PODIATRIST

## 2023-09-11 PROCEDURE — 1159F MED LIST DOCD IN RCRD: CPT | Performed by: PODIATRIST

## 2023-09-11 RX ORDER — METHYLPREDNISOLONE 4 MG/1
TABLET ORAL
Qty: 1 EACH | Refills: 0 | Status: SHIPPED | OUTPATIENT
Start: 2023-09-11

## 2023-09-11 RX ORDER — KETOCONAZOLE 20 MG/G
CREAM TOPICAL
Qty: 60 G | Refills: 1 | Status: SHIPPED | OUTPATIENT
Start: 2023-09-11

## 2023-09-11 NOTE — PROGRESS NOTES
[facility]  Progress Note    Yvan Cody is a 76year old male. Patient presents with: Foot Pain: Consult- left foot pain- lateral side. Pain started 6 weeks ago. Denies injury. Rates pain 3/10, but can get to 8/10. HPI:     This is a pleasant male with cardiac calcification, arthritis of low back. He presents to clinic today due to left foot pain on the outside. He denies any injury. He state the pain has been present for about 2 months. He did get xrays. He also complains of a rash on the bottom of his left foot which itches as well. Allergies: Molds & Smuts and Dander   Current Outpatient Medications   Medication Sig Dispense Refill    methylPREDNISolone 4 MG Oral Tablet Therapy Pack Take as directed on pack 1 each 0    ketoconazole 2 % External Cream Apply thin film to bottom and sides of feet twice daily 60 g 1    zolpidem 10 MG Oral Tab Take 1 tablet (10 mg total) by mouth nightly. 90 tablet 3    fluticasone-salmeterol 250-50 MCG/ACT Inhalation Aerosol Powder, Breath Activated Inhale 1 puff into the lungs 2 (two) times daily. Kamini Been 250-50 MCG/ACT Inhalation Aerosol Powder, Breath Activated       zolpidem 5 MG Oral Tab Take 1 tablet (5 mg total) by mouth daily. 30 tablet 0    amoxicillin clavulanate (AUGMENTIN) 500-125 MG Oral Tab Take 1 tablet by mouth 3 (three) times daily. 30 tablet 0    amoxicillin 500 MG Oral Cap       diclofenac 75 MG Oral Tab EC Take 1 tablet (75 mg total) by mouth 2 (two) times daily. 30 tablet 2    tobramycin 0.3 % Ophthalmic Ointment Place 1 Application into the right eye 3 (three) times daily. 3.5 g 1    terbinafine (LAMISIL AT) 1 % External Cream Topically bid 42 g 0    clotrimazole-betamethasone 1-0.05 % External Cream Topically bid 60 g 3    fluticasone furoate-vilanterol (BREO ELLIPTA) 100-25 MCG/INH Inhalation Aerosol Powder, Breath Activated Inhale 1 puff into the lungs daily.  1 each 5    Dorzolamide HCl 2 % Ophthalmic Solution Place 1 drop into the left eye 2 (two) times daily. latanoprost 0.005 % Ophthalmic Solution Place 1 drop into the left eye 1 DAY. albuterol 108 (90 Base) MCG/ACT Inhalation Aero Soln Inhale 2 puffs into the lungs every 6 (six) hours as needed. 1 each 5    acetaminophen-codeine 300-30 MG Oral Tab TAKE 1 TABLET BY MOUTH EVERY 6 TO 8 HOURS WITH FOOD AS NEEDED FOR PAIN      ibuprofen 800 MG Oral Tab TAKE 1 TABLET BY MOUTH EVERY 6 TO 8 HOURS WITH FOOD AS NEEDED FOR PAIN      Diclofenac Sodium (VOLTAREN) 1 % External Gel Apply 2 g topically 4 (four) times daily as needed (pain). 350 g 0    acetaminophen 500 MG Oral Tab Take 2 tablets (1,000 mg total) by mouth every 6 (six) hours as needed for Pain.         Past Medical History:   Diagnosis Date    Age-related cataract of both eyes 6/12/2020    Aortic atherosclerosis ([address] 6/12/2020    2019 on Chest CT    Arthritis of low back 6/12/2020    Hx laminectomy and revision    Asthma     as a child    Benign prostatic hyperplasia with lower urinary tract symptoms 6/12/2020    BPH associated with nocturia 6/12/2020    Bronchiectasis ([address] 6/12/2020    Cardiac calcification ([address] 6/12/2020    Class 2 severe obesity due to excess calories with serious comorbidity in adult, unspecified BMI ([address] 6/12/2020    Coronary artery calcification 6/12/2020    Dyslipidemia 6/12/2020    Dyspnea on exertion; 2019 two years 11/5/2019 11/5/2019 referred to pulmonary    Elevated PSA; 2020 10/12/2020    Granulomatous lung disease ([address] 6/12/2020    Hearing impairment     bilateral hearing aids    High cholesterol     Interstitial lung disease ([address] 6/12/2020    Known health problems: none     Pneumonitis, hypersensitivity ([address] 6/12/2020 2020 possible cause of interstitial pneumonitis    Visual impairment     glasses      Past Surgical History:   Procedure Laterality Date    CHOLECYSTECTOMY  2003    Jessica Cameronogroralia  2008    for herniated disk, required reoperation same year    SPINE SURGERY PROCEDURE UNLISTED      TONSILLECTOMY        Family History   Problem Relation Age of Onset    Cancer Mother     Cancer Sister       Social History    Socioeconomic History      Marital status:       Number of children: 4    Occupational History      Occupation:     Tobacco Use      Smoking status: Former        Years: 4.00        Types: Cigarettes        Quit date: 3/1/1973        Years since quittin.5      Smokeless tobacco: Never      Tobacco comments: former social smoker when drinking    Vaping Use      Vaping Use: Never used    Substance and Sexual Activity      Alcohol use: Not Currently        Alcohol/week: 0.0 standard drinks of alcohol        Comment: last drink 1973      Drug use: No          REVIEW OF SYSTEMS:   Denies nausea, fever, chills  No calf pain  No other muscle or joint aches  Denies chest pain or shortness of breath. EXAM:   There were no vitals taken for this visit. Constitutional:   Patient in no apparent distress. Well kept Of normal body habitus. Alert and oriented to person, place, and time. Integumentary examination:   There are no varicosities. Skin appears moist, warm, and supple with positive hair growth. Left plantar foot diffuse scaling on a erythematous base    Vascular examination:   DP pulse is 2/4  PT pulse is 2/4  Capillary refill is immediate  Edema is not present bilateral.  Temperature warm proximally to warm distally bilateral.  Neurological examination:   Vibratory (128-Hz tuning fork) sensation is present to right and is present to left. Sharp/dull is present to right and is present to left. Musculoskeletal examination:  Muscle Strength is 5/5.     POP to left 5th met base at PB insertion site      LABS & IMAGING:     Lab Results   Component Value Date     (H) 2023    BUN 8 2023    CREATSERUM 0.72 2023    BUNCREA NOT APPLICABLE     ANIONGAP 5 02/15/2021 GFRAA 97 02/24/2022    GFRNAA 83 02/24/2022    CA 9.1 03/17/2023     03/17/2023    K 4.3 03/17/2023     03/17/2023    CO2 28 03/17/2023    OSMOCALC 291 02/15/2021        No results found for: EAG, A1C     No results found. ASSESSMENT AND PLAN:   Diagnoses and all orders for this visit:    Left foot pain    Peroneal tendonitis, left    Bursitis of left foot    Athlete's foot, left    Other orders  -     methylPREDNISolone 4 MG Oral Tablet Therapy Pack; Take as directed on pack  -     ketoconazole 2 % External Cream; Apply thin film to bottom and sides of feet twice daily        Plan:     Discussed conservative management and potential for formal PT. Discussed possible oral steroids if patient is not diabetic, otherwise use of NSAIDS if no history of GERD or stomach upset. Recommend cryotherapy/icing. Discussed the importance of rest and the need for immobilization. Recommend use of compression stockings vs. ACE wrap to control edema/swelling. Xray Left foot NWB: 9/5/23  BONES: There is no acute appearing fracture or dislocation. Mild-to-moderate degenerative narrowing of the left 1st metatarsophalangeal joint and the interphalangeal joint of the left great toe. Moderate narrowing of the PIP and DIP joints of the 2nd   through 5th digits. Moderate calcaneal plantar spur, moderate spurring of the Achilles tendon insertion. Prescribed short cam boot left LE, pt to wear at all times when ambulating    Prescribed medrol dose pack    Discussed treatment options for the athlete's foot. Prescribed ketoconazole cream, pt to apply to feet twice daily    RTC 4 weeks if left foot improved will transition out of the cam boot into OTC inserts. Will also see how athlete's foot is doing as well. No follow-ups on file.     Alejandrina Summers DPM  9/11/2023

## 2023-09-18 RX ORDER — ZOLPIDEM TARTRATE 10 MG/1
10 TABLET ORAL NIGHTLY
Qty: 90 TABLET | Refills: 3 | Status: SHIPPED | OUTPATIENT
Start: 2023-09-18 | End: 2024-09-12

## 2023-09-18 RX ORDER — ZOLPIDEM TARTRATE 10 MG/1
10 TABLET ORAL NIGHTLY
Qty: 90 TABLET | Refills: 0 | OUTPATIENT
Start: 2023-09-18

## 2023-09-22 ENCOUNTER — MED REC SCAN ONLY (OUTPATIENT)
Dept: INTERNAL MEDICINE CLINIC | Facility: CLINIC | Age: 78
End: 2023-09-22

## 2023-09-25 ENCOUNTER — HOSPITAL ENCOUNTER (EMERGENCY)
Facility: HOSPITAL | Age: 78
Discharge: HOME | End: 2023-09-25
Attending: EMERGENCY MEDICINE
Payer: MEDICARE

## 2023-09-25 ENCOUNTER — APPOINTMENT (OUTPATIENT)
Dept: CT IMAGING | Facility: HOSPITAL | Age: 78
End: 2023-09-25
Attending: EMERGENCY MEDICINE
Payer: MEDICARE

## 2023-09-25 ENCOUNTER — APPOINTMENT (OUTPATIENT)
Dept: GENERAL RADIOLOGY | Facility: HOSPITAL | Age: 78
End: 2023-09-25
Attending: EMERGENCY MEDICINE
Payer: MEDICARE

## 2023-09-25 ENCOUNTER — HOSPITAL ENCOUNTER (OUTPATIENT)
Age: 78
Discharge: ANOTHER HOSPITAL | End: 2023-09-25
Payer: MEDICARE

## 2023-09-25 VITALS
SYSTOLIC BLOOD PRESSURE: 116 MMHG | OXYGEN SATURATION: 95 % | HEART RATE: 53 BPM | DIASTOLIC BLOOD PRESSURE: 61 MMHG | RESPIRATION RATE: 17 BRPM | TEMPERATURE: 98 F

## 2023-09-25 VITALS
WEIGHT: 217 LBS | HEART RATE: 60 BPM | HEIGHT: 72 IN | RESPIRATION RATE: 17 BRPM | OXYGEN SATURATION: 98 % | SYSTOLIC BLOOD PRESSURE: 122 MMHG | BODY MASS INDEX: 29.39 KG/M2 | DIASTOLIC BLOOD PRESSURE: 80 MMHG | TEMPERATURE: 98 F

## 2023-09-25 DIAGNOSIS — R51.9 ACUTE NONINTRACTABLE HEADACHE, UNSPECIFIED HEADACHE TYPE: Primary | ICD-10-CM

## 2023-09-25 DIAGNOSIS — R51.9 ACUTE INTRACTABLE HEADACHE, UNSPECIFIED HEADACHE TYPE: Primary | ICD-10-CM

## 2023-09-25 DIAGNOSIS — S66.912A STRAIN OF LEFT HAND, INITIAL ENCOUNTER: ICD-10-CM

## 2023-09-25 PROCEDURE — 73130 X-RAY EXAM OF HAND: CPT | Performed by: EMERGENCY MEDICINE

## 2023-09-25 PROCEDURE — 96372 THER/PROPH/DIAG INJ SC/IM: CPT

## 2023-09-25 PROCEDURE — 99213 OFFICE O/P EST LOW 20 MIN: CPT

## 2023-09-25 PROCEDURE — 99284 EMERGENCY DEPT VISIT MOD MDM: CPT

## 2023-09-25 PROCEDURE — 70450 CT HEAD/BRAIN W/O DYE: CPT | Performed by: EMERGENCY MEDICINE

## 2023-09-25 PROCEDURE — S0119 ONDANSETRON 4 MG: HCPCS | Performed by: EMERGENCY MEDICINE

## 2023-09-25 RX ORDER — KETOROLAC TROMETHAMINE 15 MG/ML
15 INJECTION, SOLUTION INTRAMUSCULAR; INTRAVENOUS ONCE
Status: COMPLETED | OUTPATIENT
Start: 2023-09-25 | End: 2023-09-25

## 2023-09-25 RX ORDER — KETOROLAC TROMETHAMINE 10 MG/1
10 TABLET, FILM COATED ORAL EVERY 6 HOURS PRN
Qty: 10 TABLET | Refills: 0 | Status: SHIPPED | OUTPATIENT
Start: 2023-09-25 | End: 2023-10-02

## 2023-09-25 RX ORDER — ONDANSETRON 4 MG/1
4 TABLET, ORALLY DISINTEGRATING ORAL ONCE
Status: COMPLETED | OUTPATIENT
Start: 2023-09-25 | End: 2023-09-25

## 2023-09-25 NOTE — ED INITIAL ASSESSMENT (HPI)
Presents with headache to right side of head just above ear. Onset at 10pm last night. Describes as sharp, shooting pain every 30 seconds. No nausea or dizziness. Speech clear. Denies weakness. Also c/o pain to left thumb without trauma.

## 2023-09-25 NOTE — ED INITIAL ASSESSMENT (HPI)
Pt c/o right sided headache above ear since last night. Denies blurred vision, jaw/neck pain, chest pain, SOB.

## 2023-09-26 ENCOUNTER — NURSE TRIAGE (OUTPATIENT)
Dept: INTERNAL MEDICINE CLINIC | Facility: CLINIC | Age: 78
End: 2023-09-26

## 2023-09-26 NOTE — TELEPHONE ENCOUNTER
Patient is calling in stating he was in the ED yesterday and has a persistent headache. Patient states he would like  to read over chart. Offered patient a HFU appt, patient declined and state he would prefer Evan Camara just read his chart.

## 2023-09-26 NOTE — TELEPHONE ENCOUNTER
Pulsating headache since 9/24/22 1 inch above right ear. Upset stomach. Pt informed that PCP wants him to try Excedrin over the counter. She believes it is a migraine. Reason for Disposition   SEVERE headache and not relieved by pain meds    Answer Assessment - Initial Assessment Questions  1. LOCATION: \"Where does it hurt? \"       1 inch above the right ear  2. ONSET: \"When did the headache start? \" (Minutes, hours or days)       09/24/23  3. PATTERN: \"Does the pain come and go, or has it been constant since it started? \"     Comes and goes every 40-50 seconds lasting 5 seconds  4. SEVERITY: \"How bad is the pain? \" and \"What does it keep you from doing? \"  (e.g., Scale 1-10; mild, moderate, or severe)    - MILD (1-3): doesn't interfere with normal activities     - MODERATE (4-7): interferes with normal activities or awakens from sleep     - SEVERE (8-10): excruciating pain, unable to do any normal activities         8/10  5. RECURRENT SYMPTOM: \"Have you ever had headaches before? \" If Yes, ask: \"When was the last time? \" and \"What happened that time? \"       No  6. CAUSE: \"What do you think is causing the headache? \"      No   7. MIGRAINE: \"Have you been diagnosed with migraine headaches? \" If Yes, ask: \"Is this headache similar? \"       No  8. HEAD INJURY: \"Has there been any recent injury to the head? \"       no  9. OTHER SYMPTOMS: \"Do you have any other symptoms? \" (fever, stiff neck, eye pain, sore throat, cold symptoms)      No  10. PREGNANCY: \"Is there any chance you are pregnant? \" \"When was your last menstrual period? \"        No    Protocols used: Headache-A-OH

## 2023-09-27 ENCOUNTER — TELEPHONE (OUTPATIENT)
Dept: INTERNAL MEDICINE CLINIC | Facility: CLINIC | Age: 78
End: 2023-09-27

## 2023-09-27 NOTE — TELEPHONE ENCOUNTER
Patient had an ER visit on Monday, 09/25/2023 regarding a strain in his left hand. Patient's hand is still immobile, so much so he cannot  a drinking glass; would Dr. Usha Foster like to see the patient regarding his hand?     If an appointment in necessary, please call patient to schedule:    [phone]     KG

## 2023-09-28 ENCOUNTER — PATIENT OUTREACH (OUTPATIENT)
Dept: CASE MANAGEMENT | Age: 78
End: 2023-09-28

## 2023-09-28 NOTE — PROGRESS NOTES
1st attempt ER f/up apt request     Mariana Zimmer  PCP  [address] [place] [address]  [phone]  Apt: Oct 9 @3pm     Confirmed w/ pt  Closing encounter

## 2023-10-06 ENCOUNTER — TELEPHONE (OUTPATIENT)
Dept: INTERNAL MEDICINE CLINIC | Facility: CLINIC | Age: 78
End: 2023-10-06

## 2023-10-06 DIAGNOSIS — B35.3 TINEA PEDIS, UNSPECIFIED LATERALITY: ICD-10-CM

## 2023-10-06 DIAGNOSIS — M76.70 PERONEAL TENDINITIS, UNSPECIFIED LATERALITY: Primary | ICD-10-CM

## 2023-10-06 DIAGNOSIS — M77.52 BURSITIS OF LEFT FOOT: ICD-10-CM

## 2023-10-06 NOTE — TELEPHONE ENCOUNTER
Call to patient to inform referral placed for Dr. Carlos Duarte.    Patient verbalized understanding.

## 2023-10-09 ENCOUNTER — OFFICE VISIT (OUTPATIENT)
Dept: INTERNAL MEDICINE CLINIC | Facility: CLINIC | Age: 78
End: 2023-10-09
Payer: MEDICARE

## 2023-10-09 ENCOUNTER — OFFICE VISIT (OUTPATIENT)
Dept: PODIATRY CLINIC | Facility: CLINIC | Age: 78
End: 2023-10-09

## 2023-10-09 VITALS
OXYGEN SATURATION: 95 % | WEIGHT: 217 LBS | HEIGHT: 72 IN | BODY MASS INDEX: 29.39 KG/M2 | SYSTOLIC BLOOD PRESSURE: 120 MMHG | DIASTOLIC BLOOD PRESSURE: 60 MMHG | HEART RATE: 77 BPM

## 2023-10-09 DIAGNOSIS — M19.041 PRIMARY OSTEOARTHRITIS OF BOTH HANDS: ICD-10-CM

## 2023-10-09 DIAGNOSIS — M19.042 PRIMARY OSTEOARTHRITIS OF BOTH HANDS: ICD-10-CM

## 2023-10-09 DIAGNOSIS — M77.8 THUMB TENDONITIS: Primary | ICD-10-CM

## 2023-10-09 DIAGNOSIS — M77.52 BURSITIS OF LEFT FOOT: ICD-10-CM

## 2023-10-09 DIAGNOSIS — M76.72 PERONEAL TENDONITIS, LEFT: Primary | ICD-10-CM

## 2023-10-09 DIAGNOSIS — J02.9 PHARYNGITIS, UNSPECIFIED ETIOLOGY: ICD-10-CM

## 2023-10-09 DIAGNOSIS — B35.3 ATHLETE'S FOOT, LEFT: ICD-10-CM

## 2023-10-09 PROCEDURE — 1159F MED LIST DOCD IN RCRD: CPT | Performed by: PODIATRIST

## 2023-10-09 PROCEDURE — 99213 OFFICE O/P EST LOW 20 MIN: CPT | Performed by: PODIATRIST

## 2023-10-09 RX ORDER — METHYLPREDNISOLONE ACETATE 40 MG/ML
40 INJECTION, SUSPENSION INTRA-ARTICULAR; INTRALESIONAL; INTRAMUSCULAR; SOFT TISSUE ONCE
Status: SHIPPED | OUTPATIENT
Start: 2023-10-09

## 2023-10-09 RX ORDER — AZITHROMYCIN 250 MG/1
TABLET, FILM COATED ORAL
Qty: 6 TABLET | Refills: 0 | Status: SHIPPED | OUTPATIENT
Start: 2023-10-09 | End: 2023-10-13

## 2023-10-09 NOTE — PROGRESS NOTES
[facility], [facility] Podiatry  Progress Note    Dung Sevilla is a 76year old male. Patient presents with: Foot Pain: F/u Left foot pain is getting better. HPI:     This is a pleasant male with cardiac calcification, arthritis of low back. He presents to clinic today due to left foot pain f/u. He states the medrol dose pack helped and notes 70% improvement. He did not get the cam boot. He also RTC for left athletes foot check. He states the ketoconazole cream has not been helping and states it is still very itchy. Allergies: Molds & Smuts and Dander   Current Outpatient Medications   Medication Sig Dispense Refill    zolpidem 10 MG Oral Tab Take 1 tablet (10 mg total) by mouth nightly. 90 tablet 3    ketoconazole 2 % External Cream Apply thin film to bottom and sides of feet twice daily 60 g 1    fluticasone-salmeterol 250-50 MCG/ACT Inhalation Aerosol Powder, Breath Activated Inhale 1 puff into the lungs 2 (two) times daily. Paola Khan 250-50 MCG/ACT Inhalation Aerosol Powder, Breath Activated       zolpidem 5 MG Oral Tab Take 1 tablet (5 mg total) by mouth daily. 30 tablet 0    amoxicillin clavulanate (AUGMENTIN) 500-125 MG Oral Tab Take 1 tablet by mouth 3 (three) times daily. 30 tablet 0    amoxicillin 500 MG Oral Cap       tobramycin 0.3 % Ophthalmic Ointment Place 1 Application into the right eye 3 (three) times daily. 3.5 g 1    terbinafine (LAMISIL AT) 1 % External Cream Topically bid 42 g 0    clotrimazole-betamethasone 1-0.05 % External Cream Topically bid 60 g 3    fluticasone furoate-vilanterol (BREO ELLIPTA) 100-25 MCG/INH Inhalation Aerosol Powder, Breath Activated Inhale 1 puff into the lungs daily. 1 each 5    Dorzolamide HCl 2 % Ophthalmic Solution Place 1 drop into the left eye 2 (two) times daily. latanoprost 0.005 % Ophthalmic Solution Place 1 drop into the left eye 1 DAY.       albuterol 108 (90 Base) MCG/ACT Inhalation Aero Soln Inhale 2 puffs into the lungs every 6 (six) hours as needed. 1 each 5    diclofenac 75 MG Oral Tab EC Take 1 tablet (75 mg total) by mouth 2 (two) times daily. (Patient not taking: Reported on 10/9/2023) 30 tablet 2    acetaminophen-codeine 300-30 MG Oral Tab TAKE 1 TABLET BY MOUTH EVERY 6 TO 8 HOURS WITH FOOD AS NEEDED FOR PAIN (Patient not taking: Reported on 10/9/2023)      ibuprofen 800 MG Oral Tab TAKE 1 TABLET BY MOUTH EVERY 6 TO 8 HOURS WITH FOOD AS NEEDED FOR PAIN (Patient not taking: Reported on 10/9/2023)      Diclofenac Sodium (VOLTAREN) 1 % External Gel Apply 2 g topically 4 (four) times daily as needed (pain). (Patient not taking: Reported on 10/9/2023) 350 g 0    acetaminophen 500 MG Oral Tab Take 2 tablets (1,000 mg total) by mouth every 6 (six) hours as needed for Pain.  (Patient not taking: Reported on 10/9/2023)        Past Medical History:   Diagnosis Date    Age-related cataract of both eyes 6/12/2020    Aortic atherosclerosis ([address] 6/12/2020    2019 on Chest CT    Arthritis of low back 6/12/2020    Hx laminectomy and revision    Asthma     as a child    Benign prostatic hyperplasia with lower urinary tract symptoms 6/12/2020    BPH associated with nocturia 6/12/2020    Bronchiectasis ([address] 6/12/2020    Cardiac calcification ([address] 6/12/2020    Class 2 severe obesity due to excess calories with serious comorbidity in adult, unspecified BMI ([address] 6/12/2020    Coronary artery calcification 6/12/2020    Dyslipidemia 6/12/2020    Dyspnea on exertion; 2019 two years 11/5/2019 11/5/2019 referred to pulmonary    Elevated PSA; 2020 10/12/2020    Granulomatous lung disease ([address] 6/12/2020    Hearing impairment     bilateral hearing aids    High cholesterol     Interstitial lung disease ([address] 6/12/2020    Known health problems: none     Pneumonitis, hypersensitivity ([address] 6/12/2020 2020 possible cause of interstitial pneumonitis    Visual impairment     glasses      Past Surgical History:   Procedure Laterality Date CHOLECYSTECTOMY      [address] [place] [address]      for herniated disk, required reoperation same year    SPINE SURGERY PROCEDURE UNLISTED      TONSILLECTOMY        Family History   Problem Relation Age of Onset    Cancer Mother     Cancer Sister       Social History    Socioeconomic History      Marital status:       Number of children: 3    Occupational History      Occupation:     Tobacco Use      Smoking status: Former        Years: 4        Types: Cigarettes        Quit date: 3/1/1973        Years since quittin.6      Smokeless tobacco: Never      Tobacco comments: former social smoker when drinking    Vaping Use      Vaping Use: Never used    Substance and Sexual Activity      Alcohol use: Not Currently        Alcohol/week: 0.0 standard drinks of alcohol        Comment: last drink 1973      Drug use: No          REVIEW OF SYSTEMS:   Denies nausea, fever, chills  No calf pain  No other muscle or joint aches  Denies chest pain or shortness of breath. EXAM:   There were no vitals taken for this visit. Constitutional:   Patient in no apparent distress. Well kept Of normal body habitus. Alert and oriented to person, place, and time. Integumentary examination:   There are no varicosities. Skin appears moist, warm, and supple with positive hair growth. Left plantar foot diffuse scaling on a erythematous base    Vascular examination:   DP pulse is 2/4  PT pulse is 2/4  Capillary refill is immediate  Edema is not present bilateral.  Temperature warm proximally to warm distally bilateral.  Neurological examination:   Vibratory (128-Hz tuning fork) sensation is present to right and is present to left. Sharp/dull is present to right and is present to left. Musculoskeletal examination:  Muscle Strength is 5/5.     POP to left 5th met base at PB insertion site, improved      LABS & IMAGING:     Lab Results   Component Value Date     (H) 2023    BUN 8 2023 CREATSERUM 0.72 03/17/2023    BUNCREA NOT APPLICABLE 29/76/7552    ANIONGAP 5 02/15/2021    GFRAA 97 02/24/2022    GFRNAA 83 02/24/2022    CA 9.1 03/17/2023     03/17/2023    K 4.3 03/17/2023     03/17/2023    CO2 28 03/17/2023    OSMOCALC 291 02/15/2021        No results found for: \"EAG\", \"A1C\"     No results found. ASSESSMENT AND PLAN:   Diagnoses and all orders for this visit:    Peroneal tendonitis, left    Bursitis of left foot    Athlete's foot, left          Plan:     Discussed conservative management and potential for formal PT. Discussed possible oral steroids if patient is not diabetic, otherwise use of NSAIDS if no history of GERD or stomach upset. Recommend cryotherapy/icing. Discussed the importance of rest and the need for immobilization. Recommend use of compression stockings vs. ACE wrap to control edema/swelling. Xray Left foot NWB: 9/5/23  BONES: There is no acute appearing fracture or dislocation. Mild-to-moderate degenerative narrowing of the left 1st metatarsophalangeal joint and the interphalangeal joint of the left great toe. Moderate narrowing of the PIP and DIP joints of the 2nd   through 5th digits. Moderate calcaneal plantar spur, moderate spurring of the Achilles tendon insertion. Prescribed short cam boot left LE, pt did not get    Medrol dose pack did help    Pt to cont wearing good supportive tennis shoes and will get OTC powerstep inserts. If pain increases he is to get the cam boot. Discussed treatment options for the athlete's foot. Pt states the ketoconazole cream is not helping. He was prescribed lotrisone cream in the past by his PCP and he will begin using this as prescribed for his feet. RTC PRN    No follow-ups on file.     Dar Singh DPM  10/9/23

## 2023-10-16 ENCOUNTER — TELEPHONE (OUTPATIENT)
Dept: INTERNAL MEDICINE CLINIC | Facility: CLINIC | Age: 78
End: 2023-10-16

## 2023-10-16 NOTE — TELEPHONE ENCOUNTER
Patient called. He would like to know with his arthritis if he would be able to take a drug called Mobic? His daughter is a Pharmaceutical rep and over the weekend she mentioned this medication and thought he should contact his doctor to see if she would prescribe the medication.

## 2023-11-07 ENCOUNTER — TELEPHONE (OUTPATIENT)
Dept: INTERNAL MEDICINE CLINIC | Facility: CLINIC | Age: 78
End: 2023-11-07

## 2023-11-07 DIAGNOSIS — H35.81 MACULAR EDEMA: Primary | ICD-10-CM

## 2023-11-07 NOTE — TELEPHONE ENCOUNTER
Ophthalmology referral placed per PCP request. Routed to [facility] care to expidite

## 2023-11-17 ENCOUNTER — MED REC SCAN ONLY (OUTPATIENT)
Dept: INTERNAL MEDICINE CLINIC | Facility: CLINIC | Age: 78
End: 2023-11-17

## 2023-11-21 ENCOUNTER — HOSPITAL ENCOUNTER (OUTPATIENT)
Age: 78
Discharge: HOME | End: 2023-11-21
Payer: MEDICARE

## 2023-11-21 VITALS
HEART RATE: 58 BPM | OXYGEN SATURATION: 97 % | SYSTOLIC BLOOD PRESSURE: 135 MMHG | DIASTOLIC BLOOD PRESSURE: 61 MMHG | TEMPERATURE: 97 F | RESPIRATION RATE: 20 BRPM

## 2023-11-21 DIAGNOSIS — R52 PAIN: Primary | ICD-10-CM

## 2023-11-21 LAB
#MXD IC: 1 X10ˆ3/UL (ref 0.1–1)
BUN BLD-MCNC: 12 MG/DL (ref 7–18)
CHLORIDE BLD-SCNC: 106 MMOL/L (ref 98–112)
CO2 BLD-SCNC: 25 MMOL/L (ref 21–32)
CREAT BLD-MCNC: 0.7 MG/DL
EGFRCR SERPLBLD CKD-EPI 2021: 93 ML/MIN/1.73M2 (ref 60–?)
GLUCOSE BLD-MCNC: 128 MG/DL (ref 70–99)
HCT VFR BLD AUTO: 44.3 %
HCT VFR BLD CALC: 46 %
HGB BLD-MCNC: 14.4 G/DL
ISTAT IONIZED CALCIUM FOR CHEM 8: 1.17 MMOL/L (ref 1.12–1.32)
LYMPHOCYTES # BLD AUTO: 3.2 X10ˆ3/UL (ref 1–4)
LYMPHOCYTES NFR BLD AUTO: 35.7 %
MCH RBC QN AUTO: 29.8 PG (ref 26–34)
MCHC RBC AUTO-ENTMCNC: 32.5 G/DL (ref 31–37)
MCV RBC AUTO: 91.7 FL (ref 80–100)
MIXED CELL %: 11.4 %
NEUTROPHILS # BLD AUTO: 4.9 X10ˆ3/UL (ref 1.5–7.7)
NEUTROPHILS NFR BLD AUTO: 52.9 %
PLATELET # BLD AUTO: 326 X10ˆ3/UL (ref 150–450)
POTASSIUM BLD-SCNC: 4.2 MMOL/L (ref 3.6–5.1)
RBC # BLD AUTO: 4.83 X10ˆ6/UL
SODIUM BLD-SCNC: 142 MMOL/L (ref 136–145)
WBC # BLD AUTO: 9.1 X10ˆ3/UL (ref 4–11)

## 2023-11-21 PROCEDURE — 99213 OFFICE O/P EST LOW 20 MIN: CPT

## 2023-11-21 PROCEDURE — 99214 OFFICE O/P EST MOD 30 MIN: CPT

## 2023-11-21 PROCEDURE — 80047 BASIC METABLC PNL IONIZED CA: CPT

## 2023-11-21 PROCEDURE — 36415 COLL VENOUS BLD VENIPUNCTURE: CPT

## 2023-11-21 PROCEDURE — 85025 COMPLETE CBC W/AUTO DIFF WBC: CPT | Performed by: NURSE PRACTITIONER

## 2023-11-21 RX ORDER — METHYLPREDNISOLONE 4 MG/1
TABLET ORAL
Qty: 1 EACH | Refills: 0 | Status: SHIPPED | OUTPATIENT
Start: 2023-11-21

## 2023-11-21 NOTE — ED INITIAL ASSESSMENT (HPI)
Patient reports bilateral posterior knee pain. Also reports pain to arms, hands, feet. Denies injury/trauma. Has been taking mobic at home for the past month without improvement.

## 2023-11-21 NOTE — DISCHARGE INSTRUCTIONS
Rest.  Take the Medrol Dosepak as prescribed. Make a close follow-up appointment with your primary care doctor, you will need further testing done if your symptoms persist.  Return for any concerns.

## 2023-12-08 ENCOUNTER — OFFICE VISIT (OUTPATIENT)
Dept: INTERNAL MEDICINE CLINIC | Facility: CLINIC | Age: 78
End: 2023-12-08
Payer: MEDICARE

## 2023-12-08 VITALS
OXYGEN SATURATION: 97 % | DIASTOLIC BLOOD PRESSURE: 60 MMHG | BODY MASS INDEX: 28.44 KG/M2 | HEART RATE: 69 BPM | WEIGHT: 210 LBS | SYSTOLIC BLOOD PRESSURE: 120 MMHG | HEIGHT: 72 IN

## 2023-12-08 DIAGNOSIS — M75.22 TENDONITIS OF UPPER BICEPS TENDON OF LEFT SHOULDER: Primary | ICD-10-CM

## 2023-12-08 DIAGNOSIS — R05.3 CHRONIC COUGH: ICD-10-CM

## 2023-12-08 PROCEDURE — 1159F MED LIST DOCD IN RCRD: CPT | Performed by: INTERNAL MEDICINE

## 2023-12-08 PROCEDURE — 3074F SYST BP LT 130 MM HG: CPT | Performed by: INTERNAL MEDICINE

## 2023-12-08 PROCEDURE — 3078F DIAST BP <80 MM HG: CPT | Performed by: INTERNAL MEDICINE

## 2023-12-08 PROCEDURE — 99214 OFFICE O/P EST MOD 30 MIN: CPT | Performed by: INTERNAL MEDICINE

## 2023-12-08 PROCEDURE — 1160F RVW MEDS BY RX/DR IN RCRD: CPT | Performed by: INTERNAL MEDICINE

## 2023-12-08 PROCEDURE — 3008F BODY MASS INDEX DOCD: CPT | Performed by: INTERNAL MEDICINE

## 2023-12-08 RX ORDER — NABUMETONE 750 MG/1
750 TABLET, FILM COATED ORAL 2 TIMES DAILY
Qty: 60 TABLET | Refills: 3 | Status: SHIPPED | OUTPATIENT
Start: 2023-12-08

## 2023-12-22 ENCOUNTER — TELEPHONE (OUTPATIENT)
Dept: INTERNAL MEDICINE CLINIC | Facility: CLINIC | Age: 78
End: 2023-12-22

## 2023-12-22 ENCOUNTER — HOSPITAL ENCOUNTER (OUTPATIENT)
Age: 78
Discharge: HOME | End: 2023-12-22
Payer: MEDICARE

## 2023-12-22 VITALS
SYSTOLIC BLOOD PRESSURE: 124 MMHG | TEMPERATURE: 98 F | DIASTOLIC BLOOD PRESSURE: 60 MMHG | RESPIRATION RATE: 18 BRPM | OXYGEN SATURATION: 95 % | HEART RATE: 58 BPM

## 2023-12-22 DIAGNOSIS — M25.50 PAIN IN JOINT, MULTIPLE SITES: Primary | ICD-10-CM

## 2023-12-22 DIAGNOSIS — M19.90 ARTHRITIS: Primary | ICD-10-CM

## 2023-12-22 PROCEDURE — 99213 OFFICE O/P EST LOW 20 MIN: CPT

## 2023-12-22 RX ORDER — ACETAMINOPHEN AND CODEINE PHOSPHATE 300; 30 MG/1; MG/1
1 TABLET ORAL EVERY 6 HOURS PRN
Qty: 12 TABLET | Refills: 0 | Status: SHIPPED | OUTPATIENT
Start: 2023-12-22

## 2023-12-22 NOTE — ED INITIAL ASSESSMENT (HPI)
Patient developed body pains in feet, knees, shoulders and some fingers in September. Patient states he has been at his PCP and felt better with Prednisone but pain returned once he stopped. Started Nabumetone 750mg 2 weeks ago without relief. Patient is here because he is now having impairment in his daily life d/t the pain. He cannot walk well and unable to sleep comfortably.

## 2023-12-24 ENCOUNTER — HOSPITAL ENCOUNTER (EMERGENCY)
Facility: HOSPITAL | Age: 78
Discharge: HOME | End: 2023-12-24
Attending: EMERGENCY MEDICINE
Payer: MEDICARE

## 2023-12-24 VITALS
WEIGHT: 208 LBS | HEART RATE: 60 BPM | DIASTOLIC BLOOD PRESSURE: 68 MMHG | HEIGHT: 72 IN | TEMPERATURE: 99 F | RESPIRATION RATE: 18 BRPM | OXYGEN SATURATION: 98 % | BODY MASS INDEX: 28.17 KG/M2 | SYSTOLIC BLOOD PRESSURE: 123 MMHG

## 2023-12-24 DIAGNOSIS — M25.50 MULTIPLE JOINT PAIN: Primary | ICD-10-CM

## 2023-12-24 LAB
ERYTHROCYTE [SEDIMENTATION RATE] IN BLOOD: 53 MM/HR
RHEUMATOID FACT SERPL-ACNC: 18 IU/ML (ref ?–14)

## 2023-12-24 PROCEDURE — 99284 EMERGENCY DEPT VISIT MOD MDM: CPT

## 2023-12-24 PROCEDURE — 86431 RHEUMATOID FACTOR QUANT: CPT | Performed by: EMERGENCY MEDICINE

## 2023-12-24 PROCEDURE — 86200 CCP ANTIBODY: CPT | Performed by: EMERGENCY MEDICINE

## 2023-12-24 PROCEDURE — 86039 ANTINUCLEAR ANTIBODIES (ANA): CPT | Performed by: EMERGENCY MEDICINE

## 2023-12-24 PROCEDURE — 99283 EMERGENCY DEPT VISIT LOW MDM: CPT

## 2023-12-24 PROCEDURE — 36415 COLL VENOUS BLD VENIPUNCTURE: CPT

## 2023-12-24 PROCEDURE — 96372 THER/PROPH/DIAG INJ SC/IM: CPT

## 2023-12-24 PROCEDURE — 86038 ANTINUCLEAR ANTIBODIES: CPT | Performed by: EMERGENCY MEDICINE

## 2023-12-24 PROCEDURE — 85652 RBC SED RATE AUTOMATED: CPT | Performed by: EMERGENCY MEDICINE

## 2023-12-24 RX ORDER — HYDROCODONE BITARTRATE AND ACETAMINOPHEN 5; 325 MG/1; MG/1
1 TABLET ORAL EVERY 6 HOURS PRN
Qty: 10 TABLET | Refills: 0 | Status: SHIPPED | OUTPATIENT
Start: 2023-12-24 | End: 2023-12-29

## 2023-12-24 RX ORDER — HYDROCODONE BITARTRATE AND ACETAMINOPHEN 5; 325 MG/1; MG/1
2 TABLET ORAL ONCE
Status: COMPLETED | OUTPATIENT
Start: 2023-12-24 | End: 2023-12-24

## 2023-12-24 RX ORDER — HYDROCODONE BITARTRATE AND ACETAMINOPHEN 5; 325 MG/1; MG/1
1 TABLET ORAL ONCE
Status: DISCONTINUED | OUTPATIENT
Start: 2023-12-24 | End: 2023-12-24

## 2023-12-24 RX ORDER — KETOROLAC TROMETHAMINE 30 MG/ML
30 INJECTION, SOLUTION INTRAMUSCULAR; INTRAVENOUS ONCE
Status: COMPLETED | OUTPATIENT
Start: 2023-12-24 | End: 2023-12-24

## 2023-12-25 NOTE — ED INITIAL ASSESSMENT (HPI)
[de-identified] y/o male arrives for eval of pain in several joints. Bilateral feet, knees, thumbs and L arm. Pt has been seen at [facility] and pcp for same pain and nothing he has been prescribed helps. Denies fever/chill. Reports pain is consistent throughout the day.

## 2023-12-25 NOTE — DISCHARGE INSTRUCTIONS
Return if any worsening symptoms. Please be sure that your rheumatologist follows up the lab test that we took today.

## 2023-12-25 NOTE — ED QUICK NOTES
Pt discharged to home by [facility]. Assisted out to the waiting room in a wheelchair.

## 2023-12-26 ENCOUNTER — TELEPHONE (OUTPATIENT)
Dept: INTERNAL MEDICINE CLINIC | Facility: CLINIC | Age: 78
End: 2023-12-26

## 2023-12-26 LAB
CCP IGG SERPL-ACNC: >340 U/ML (ref 0–6.9)
NUCLEAR IGG TITR SER IF: POSITIVE {TITER}

## 2023-12-26 NOTE — TELEPHONE ENCOUNTER
Future Appointments   Date Time Provider Jaki Rodarte   12/28/2023  2:00 PM MD SORAIDA Davis [address]   1/19/2024 11:30 AM MD SORAIDA Davis [address]   1/30/2024 10:00 AM Bhupendra Ellis DO [address] [place]   Spoke with pt who states that he is still having a lot of pain and limited range of motion with his arms due to pain. He has both feet swelling.  Pain is 8/10 now after norco this am.

## 2023-12-26 NOTE — TELEPHONE ENCOUNTER
Patient was in the emergency room on 12/24 due to the amount of pain he was in from arthritis. He called this morning, Dr. Lisa Cevallos office to schedule an appointment with her and the first appointment available is on 1/302024. Dr. Veda Ahumada office said he could call and ask his primary doctor to reach out to Dr. Veda Ahumada office to help him get into her office sooner.        He said he can't deal with the pain for 30 days until he can see a specialist!

## 2023-12-28 ENCOUNTER — OFFICE VISIT (OUTPATIENT)
Dept: INTERNAL MEDICINE CLINIC | Facility: CLINIC | Age: 78
End: 2023-12-28
Payer: MEDICARE

## 2023-12-28 VITALS
WEIGHT: 212 LBS | HEART RATE: 72 BPM | BODY MASS INDEX: 28.1 KG/M2 | OXYGEN SATURATION: 98 % | HEIGHT: 73 IN | SYSTOLIC BLOOD PRESSURE: 110 MMHG | DIASTOLIC BLOOD PRESSURE: 60 MMHG

## 2023-12-28 DIAGNOSIS — M19.90 INFLAMMATORY ARTHRITIS: Primary | ICD-10-CM

## 2023-12-28 LAB — ANA NUCLEOLAR TITR SER IF: 320 {TITER}

## 2023-12-28 PROCEDURE — 3078F DIAST BP <80 MM HG: CPT | Performed by: INTERNAL MEDICINE

## 2023-12-28 PROCEDURE — 86200 CCP ANTIBODY: CPT | Performed by: INTERNAL MEDICINE

## 2023-12-28 PROCEDURE — 86225 DNA ANTIBODY NATIVE: CPT | Performed by: INTERNAL MEDICINE

## 2023-12-28 PROCEDURE — 3074F SYST BP LT 130 MM HG: CPT | Performed by: INTERNAL MEDICINE

## 2023-12-28 PROCEDURE — 86038 ANTINUCLEAR ANTIBODIES: CPT | Performed by: INTERNAL MEDICINE

## 2023-12-28 PROCEDURE — 96372 THER/PROPH/DIAG INJ SC/IM: CPT | Performed by: INTERNAL MEDICINE

## 2023-12-28 PROCEDURE — 99213 OFFICE O/P EST LOW 20 MIN: CPT | Performed by: INTERNAL MEDICINE

## 2023-12-28 PROCEDURE — 3008F BODY MASS INDEX DOCD: CPT | Performed by: INTERNAL MEDICINE

## 2023-12-28 PROCEDURE — 86140 C-REACTIVE PROTEIN: CPT | Performed by: INTERNAL MEDICINE

## 2023-12-28 RX ORDER — METHYLPREDNISOLONE SODIUM SUCCINATE 125 MG/2ML
125 INJECTION, POWDER, LYOPHILIZED, FOR SOLUTION INTRAMUSCULAR; INTRAVENOUS ONCE
Status: COMPLETED | OUTPATIENT
Start: 2023-12-28 | End: 2023-12-28

## 2023-12-29 LAB
CCP IGG SERPL-ACNC: >340 U/ML (ref 0–6.9)
CRP SERPL-MCNC: 0.8 MG/DL (ref ?–1)
DSDNA IGG SERPL IA-ACNC: 1.3 IU/ML
ENA AB SER QL IA: 0.3 UG/L
ENA AB SER QL IA: NEGATIVE

## 2024-01-16 ENCOUNTER — OFFICE VISIT (OUTPATIENT)
Dept: RHEUMATOLOGY | Facility: CLINIC | Age: 79
End: 2024-01-16

## 2024-01-16 ENCOUNTER — TELEPHONE (OUTPATIENT)
Dept: RHEUMATOLOGY | Facility: CLINIC | Age: 79
End: 2024-01-16

## 2024-01-16 VITALS
SYSTOLIC BLOOD PRESSURE: 110 MMHG | RESPIRATION RATE: 16 BRPM | HEART RATE: 103 BPM | HEIGHT: 73 IN | BODY MASS INDEX: 28.1 KG/M2 | WEIGHT: 212 LBS | DIASTOLIC BLOOD PRESSURE: 67 MMHG

## 2024-01-16 DIAGNOSIS — R06.02 SOB (SHORTNESS OF BREATH): ICD-10-CM

## 2024-01-16 DIAGNOSIS — M25.511 RIGHT SHOULDER PAIN, UNSPECIFIED CHRONICITY: ICD-10-CM

## 2024-01-16 DIAGNOSIS — M05.79 RHEUMATOID ARTHRITIS INVOLVING MULTIPLE SITES WITH POSITIVE RHEUMATOID FACTOR (HCC): Primary | ICD-10-CM

## 2024-01-16 PROCEDURE — 3074F SYST BP LT 130 MM HG: CPT | Performed by: INTERNAL MEDICINE

## 2024-01-16 PROCEDURE — 1159F MED LIST DOCD IN RCRD: CPT | Performed by: INTERNAL MEDICINE

## 2024-01-16 PROCEDURE — 20610 DRAIN/INJ JOINT/BURSA W/O US: CPT | Performed by: INTERNAL MEDICINE

## 2024-01-16 PROCEDURE — 1160F RVW MEDS BY RX/DR IN RCRD: CPT | Performed by: INTERNAL MEDICINE

## 2024-01-16 PROCEDURE — 3078F DIAST BP <80 MM HG: CPT | Performed by: INTERNAL MEDICINE

## 2024-01-16 PROCEDURE — 3008F BODY MASS INDEX DOCD: CPT | Performed by: INTERNAL MEDICINE

## 2024-01-16 PROCEDURE — 99204 OFFICE O/P NEW MOD 45 MIN: CPT | Performed by: INTERNAL MEDICINE

## 2024-01-16 RX ORDER — TRIAMCINOLONE ACETONIDE 40 MG/ML
40 INJECTION, SUSPENSION INTRA-ARTICULAR; INTRAMUSCULAR ONCE
Status: COMPLETED | OUTPATIENT
Start: 2024-01-16 | End: 2024-01-16

## 2024-01-16 RX ORDER — METHOTREXATE 2.5 MG/1
10 TABLET ORAL WEEKLY
Qty: 20 TABLET | Refills: 1 | Status: SHIPPED | OUTPATIENT
Start: 2024-01-16 | End: 2024-02-15

## 2024-01-16 RX ORDER — PREDNISONE 10 MG/1
10 TABLET ORAL 2 TIMES DAILY
Qty: 60 TABLET | Refills: 1 | Status: SHIPPED | OUTPATIENT
Start: 2024-01-16

## 2024-01-16 RX ORDER — FOLIC ACID 1 MG/1
1 TABLET ORAL DAILY
Qty: 90 TABLET | Refills: 3 | Status: SHIPPED | OUTPATIENT
Start: 2024-01-16 | End: 2025-01-15

## 2024-01-16 NOTE — PATIENT INSTRUCTIONS
Start methotrexate 4 pills onc a week and folic acid 1mg a day   Info on rheumatoid arthritis   Rest right shoulder x 3 days   Cont. Prednisone 10mg twice ad ay   Rtc in 3 weeks - 2/5 at 12:40 pm

## 2024-01-16 NOTE — PROGRESS NOTES
Filiberto Moeller is a 78 year old male who presents for   Chief Complaint   Patient presents with    Consult     Arthritis   .   HPI:     I had the pleasure of seeing Filiberto Moeller on 1/16/2024 for evaluation.      He is a pleasant 78 year old who has polyarthralgia, positive RF and CCP > 350. He was referred by Dr. Lopez.   He had noticed joint pain in 9/2023.   He had progressive pain over the last several months. He ended up in the hospital - ED in 12/23/2023 and had right knee swelling.   In the last 2 weeks he had severe right shoulder pain.   He has 2/10 pain. But he has 10/10 pain when he moves his arms.   He's on prednisone 10mg bid  and nabumetone 750mg bid - from the ED.   He has noticed some trouble breathing.   He's had trouble for a while. It's unusual for him in the last 4-6 weeks.   He's not sure if he feels better.   His right shoulder is still hurting.   The back of both knees are still aching - 2-3/10 pain.   His feet no longer hurts.  The steroids shot helped with dr. Lopez but got 125mg I'm in  clinic on 12/28/2023 -       Wt Readings from Last 2 Encounters:   01/16/24 212 lb (96.2 kg)   12/28/23 212 lb (96.2 kg)     Body mass index is 27.97 kg/m².      Current Outpatient Medications   Medication Sig Dispense Refill    predniSONE 10 MG Oral Tab Take 1 tablet (10 mg total) by mouth 2 (two) times daily. 40 tablet 1    acetaminophen-codeine 300-30 MG Oral Tab Take 1 tablet by mouth every 6 (six) hours as needed for Pain (Severe pain). 12 tablet 0    nabumetone 750 MG Oral Tab Take 1 tablet (750 mg total) by mouth 2 (two) times daily. 60 tablet 3    methylPREDNISolone (MEDROL) 4 MG Oral Tablet Therapy Pack Dosepack: take as directed 1 each 0    zolpidem 10 MG Oral Tab Take 1 tablet (10 mg total) by mouth nightly. 90 tablet 3    ketoconazole 2 % External Cream Apply thin film to bottom and sides of feet twice daily 60 g 1    fluticasone-salmeterol 250-50 MCG/ACT Inhalation Aerosol Powder,  Breath Activated Inhale 1 puff into the lungs 2 (two) times daily.      WIXELA INHUB 250-50 MCG/ACT Inhalation Aerosol Powder, Breath Activated       zolpidem 5 MG Oral Tab Take 1 tablet (5 mg total) by mouth daily. 30 tablet 0    amoxicillin clavulanate (AUGMENTIN) 500-125 MG Oral Tab Take 1 tablet by mouth 3 (three) times daily. 30 tablet 0    amoxicillin 500 MG Oral Cap       tobramycin 0.3 % Ophthalmic Ointment Place 1 Application into the right eye 3 (three) times daily. 3.5 g 1    terbinafine (LAMISIL AT) 1 % External Cream Topically bid 42 g 0    clotrimazole-betamethasone 1-0.05 % External Cream Topically bid 60 g 3    fluticasone furoate-vilanterol (BREO ELLIPTA) 100-25 MCG/INH Inhalation Aerosol Powder, Breath Activated Inhale 1 puff into the lungs daily. 1 each 5    Dorzolamide HCl 2 % Ophthalmic Solution Place 1 drop into the left eye 2 (two) times daily.      latanoprost 0.005 % Ophthalmic Solution Place 1 drop into the left eye 1 DAY.      albuterol 108 (90 Base) MCG/ACT Inhalation Aero Soln Inhale 2 puffs into the lungs every 6 (six) hours as needed. 1 each 5    acetaminophen-codeine 300-30 MG Oral Tab TAKE 1 TABLET BY MOUTH EVERY 6 TO 8 HOURS WITH FOOD AS NEEDED FOR PAIN (Patient not taking: Reported on 10/9/2023)      ibuprofen 800 MG Oral Tab TAKE 1 TABLET BY MOUTH EVERY 6 TO 8 HOURS WITH FOOD AS NEEDED FOR PAIN (Patient not taking: Reported on 10/9/2023)      Diclofenac Sodium (VOLTAREN) 1 % External Gel Apply 2 g topically 4 (four) times daily as needed (pain). (Patient not taking: Reported on 10/9/2023) 350 g 0    acetaminophen 500 MG Oral Tab Take 2 tablets (1,000 mg total) by mouth every 6 (six) hours as needed for Pain. (Patient not taking: Reported on 10/9/2023)        Past Medical History:   Diagnosis Date    Age-related cataract of both eyes 6/12/2020    Aortic atherosclerosis (HCC) 6/12/2020    2019 on Chest CT    Arthritis of low back 6/12/2020    Hx laminectomy and revision    Asthma      as a child    Benign prostatic hyperplasia with lower urinary tract symptoms 2020    BPH associated with nocturia 2020    Bronchiectasis (HCC) 2020    Cardiac calcification (HCC) 2020    Class 2 severe obesity due to excess calories with serious comorbidity in adult, unspecified BMI (HCC) 2020    Coronary artery calcification 2020    Dyslipidemia 2020    Dyspnea on exertion;  two years 2019 referred to pulmonary    Elevated PSA; 2020 10/12/2020    Granulomatous lung disease (HCC) 2020    Hearing impairment     bilateral hearing aids    High cholesterol     Interstitial lung disease (HCC) 2020    Known health problems: none     Pneumonitis, hypersensitivity (HCC) 2020 possible cause of interstitial pneumonitis    Visual impairment     glasses      Past Surgical History:   Procedure Laterality Date    CHOLECYSTECTOMY      LAMINECTOMY,LUMBAR      for herniated disk, required reoperation same year    SPINE SURGERY PROCEDURE UNLISTED      TONSILLECTOMY        Family History   Problem Relation Age of Onset    Cancer Mother     Cancer Sister       Social History:  Social History     Socioeconomic History    Marital status:     Number of children: 4   Occupational History    Occupation:    Tobacco Use    Smoking status: Former     Years: 4     Types: Cigarettes     Quit date: 3/1/1973     Years since quittin.9    Smokeless tobacco: Never    Tobacco comments:     former social smoker when drinking   Vaping Use    Vaping Use: Never used   Substance and Sexual Activity    Alcohol use: Not Currently     Alcohol/week: 0.0 standard drinks of alcohol     Comment: last drink 1973    Drug use: No           REVIEW OF SYSTEMS:   Review Of Systems:  Fatigue  Constitutional:No fever, no change in weight or appetitie  Derm: No rashes, no oral ulcers, no alopecia, no photosensitivity, no psoriasis  HEENT: No dry eyes, no  dry mouth, no Raynaud's, no nasal ulcers, no parotid swelling, no neck pain, no jaw pain, no temple pain  Eyes: No visual changes,   CVS: No chest pain, no heart disease  RS: No SOB, no Cough, No Pleurtic pain,   GI: No nausea, no vomiiting, no abominal pain, no hx of ulcer, no gastritis, no heartburn, no dyshpagia, no BRBPR or melena  : no dysuria, no hx of miscarriages, no DVT Hx, no hx of OCP,   Neuro: No numbness or tingling, no headache, no hx of seizures,   Psych: no hx of anxiety or depression  ENDO: no hx of thyroid disease, no hx of DM  Joint/Muscluskeltal: see HPI,   All other ROS are negative.     EXAM:   /67   Pulse 103   Resp 16   Ht 6' 1\" (1.854 m)   Wt 212 lb (96.2 kg)   BMI 27.97 kg/m²   HEENT: Clear oropharynx, no oral ulcers, EOM intact, clear sclear, PERRLA, pleasant, no acute distress, no CAD,   No rashes  CVS: RRR, no murmurs  RS: CTAB, no crackles, no rhonchi  ABD: Soft Non tender, no HSM felt, BS positive  Joint exam:   no neck tendnerness, good ROM,   Right shoulder +1 swelling - can't abduct more than 60 degrees - very tender     EXTREMITIES: no cyanosis, clubbing or edema  NEURO: intact touch, 5/5 ue and le strength    Component      Latest Ref [facility] 12/24/2023   Expanded TALIB Antibody Screen, IGG      <0.7 ug/l    Anti-dsDNA antibody      <10 IU/mL    Connective Tissue Disease Screen Interpretation      Negative     ZACH Titer/Pattern      <80  320 !    Reviewed By: Anastasiya Dorado M.D.    SED RATE      0 - 20 mm/Hr 53 (H)    ZACH SCREEN      Negative  Positive !    RHEUMATOID FACTOR      <14 IU/mL 18 (H)    C-Citrullinated Peptide IgG AB      0.0 - 6.9 U/mL >340.0 (H)    C-REACTIVE PROTEIN      <1.00 mg/dL      Component      Latest Ref [facility] 12/28/2023   Expanded TALIB Antibody Screen, IGG      <0.7 ug/l 0.30    Anti-dsDNA antibody      <10 IU/mL 1.3    Connective Tissue Disease Screen Interpretation      Negative  Negative    ZACH Titer/Pattern      <80     Reviewed By:    SED RATE       0 - 20 mm/Hr    ZACH SCREEN      Negative     RHEUMATOID FACTOR      <14 IU/mL    C-Citrullinated Peptide IgG AB      0.0 - 6.9 U/mL >340.0 (H)    C-REACTIVE PROTEIN      <1.00 mg/dL 0.80       Legend:  ! Abnormal  (H) High  ASSESSMENT AND PLAN:   Filiberto Moeller is a 78 year old male who presents for   Chief Complaint   Patient presents with    Consult     Arthritis     Newly dx rheumatoid arthritis  - pos RF and CCP - With severe right shoulder pain   - info given on rheumatoid   - Cont. Prednisone 10mg twice ad ay   Given an injection today for right shoulder :  With paitent's consent, I injected pt's right shoulder gh area  with 1ml lidocaine 1 % and 1 ml kenalog 40. It was done under sterile technique using iodine and alcohol swabs and ethyl chloride was used as an anaesthetic spray. Pt.  tolerated it well.    Start methotrexate 10mg a week and folic acid 1mg a day   Discussed with patient risks and benefits, including hepatoxicity risk, teratogenicity -  not to get pregnant on the medication and infections risk.  Rtc in 3 weeks - 2/5 at 12:40 pm     4. Hx of iLd -   Summary:  Start methotrexate 4 pills onc a week and folic acid 1mg a day   Info on rheumatoid   Rest right shoulder x 3 days   Cont. Prednisone 10mg twice ad ay   Rtc in 3 weeks - 2/5 at 12:40 pm     Cynthia Lewis MD  1/16/2024  4:49 PM    Total time spent caring for the patient on the day of the encounter:  45 min  This includes pre-charting, reviewing and obtaining results, exam, plan, notes, and counseling.

## 2024-01-16 NOTE — PROCEDURES
With paitent's consent, I injected pt's right shoulder gh area  with 1ml lidocaine 1 % and 1 ml kenalog 40. It was done under sterile technique using iodine and alcohol swabs and ethyl chloride was used as an anaesthetic spray. Pt.  tolerated it well.

## 2024-01-17 NOTE — TELEPHONE ENCOUNTER
Appointment scheduled as requested.       Future Appointments   Date Time Provider Department Center   1/19/2024 11:30 AM Lea Lopez MD [facility] [address]   2/5/2024 12:40 PM Cynthia Lewis MD [facility]

## 2024-01-19 ENCOUNTER — TELEPHONE (OUTPATIENT)
Dept: INTERNAL MEDICINE CLINIC | Facility: CLINIC | Age: 79
End: 2024-01-19

## 2024-01-24 ENCOUNTER — TELEPHONE (OUTPATIENT)
Dept: RHEUMATOLOGY | Facility: CLINIC | Age: 79
End: 2024-01-24

## 2024-01-24 NOTE — TELEPHONE ENCOUNTER
Patient is currently admitted into the hospital and wants the doctor to know he is unable to take the methotrexate he was prescribed because it is at home.  Patient is wondering how to proceed with the medication once he is able to get back on it.

## 2024-01-25 NOTE — TELEPHONE ENCOUNTER
Please let him know it's ok to stop the methotrexate - he needs to heal completely from the pneumonia before starting a rheumatoid arthriits medications -   He has a follow up with me on 2/5 - hopefully he can come to that appt. And can discuss when he can restart at that time

## 2024-01-25 NOTE — TELEPHONE ENCOUNTER
Phoned pt and informed him he should be off Methotrexate now due to the pneumonia. Directed him to stay off Methotrexate until at least 2/5/24 when he is scheduled to see Dr Lewis next. [facility]

## 2024-03-08 ENCOUNTER — TELEPHONE (OUTPATIENT)
Dept: INTERNAL MEDICINE CLINIC | Facility: CLINIC | Age: 79
End: 2024-03-08

## 2024-03-15 ENCOUNTER — TELEPHONE (OUTPATIENT)
Dept: RHEUMATOLOGY | Facility: CLINIC | Age: 79
End: 2024-03-15

## 2024-03-15 NOTE — TELEPHONE ENCOUNTER
L/m for patient, appointment made for 5-23-24 at 9:20 in [place] with Dr PASCUAL.  Please call back to let us know if this works or if we need to reschedule

## 2024-03-15 NOTE — TELEPHONE ENCOUNTER
Hi - can we help him make a follow up visit? He had new case of RA - and never came for the 2/5 follow up -

## (undated) DEVICE — Device: Brand: JELCO

## (undated) DEVICE — CANNULA ANTERIOR CHAMBER 25G

## (undated) DEVICE — PREP BETADINE SOL 5% EYE

## (undated) DEVICE — 1535-1 MICROPORE DISPENSER PK 1" X 10YDS, 12 RLS/BX: Brand: 3M™ MICROPORE™

## (undated) DEVICE — SOL H2O 1000ML BTL

## (undated) DEVICE — CLEARCUT® SLIT KNIFE INTREPID MICRO-COAXIAL SYSTEM 2.4 SB: Brand: CLEARCUT®; INTREPID

## (undated) DEVICE — SINGLE USE MEDICAL DEVICE FOR OPHTHALMIC SURGERY: Brand: SIL. COATED I/A 45 MIL 12/B

## (undated) DEVICE — UNFOLDER PLATINUM 1 SERIES CRTRDG 30/BOX: Brand: UNFOLDER PLATINUM 1 SERIES

## (undated) DEVICE — STERILE POLYISOPRENE POWDER-FREE SURGICAL GLOVES: Brand: PROTEXIS

## (undated) DEVICE — CLEARCUT® SIDEPORT KNIFE DUAL BEVEL 1.0MM ANGLED: Brand: CLEARCUT®

## (undated) DEVICE — BSS BAG CENTURION

## (undated) DEVICE — SYRINGE 50ML LL TIP

## (undated) DEVICE — ACTIVE FMS W/ INTREPID* ULTRA SLEEVES, 0.9MM 30° ABS* INTREPID* BALANCED TIP: Brand: ALCON

## (undated) DEVICE — EYE PACK: Brand: MEDLINE INDUSTRIES, INC.

## (undated) DEVICE — 3M™ DURAPORE™ SURGICAL TAPE 2 INCHES X 10YARDS (5.0CM X 9.1M) 6ROLLS/CARTON 10CARTONS/CASE 1538-2: Brand: 3M™ DURAPORE™

## (undated) DEVICE — NEEDLE CYSTOTOME W/25G CANNULA

## (undated) DEVICE — Device: Brand: MALYUGIN RING SYSTEM 7.0MM

## (undated) DEVICE — CATARACT PATIENT CARE KIT

## (undated) DEVICE — BANDAGE ROLL,100% COTTON, 6 PLY, LARGE: Brand: KERLIX

## (undated) NOTE — Clinical Note
Patient Name: Wilton Dear  : 10/31/1945  MRN: [id number]  Patient Address: [address] [place] [address]      Coronavirus Disease 2019 (COVID-19)     [facility] is committed to the safety and well-being of our patients, members, e 2. Monitor your symptoms carefully. If your symptoms get worse, call your healthcare provider immediately. 3. Get rest and stay hydrated.    4. If you have a medical appointment, call the healthcare provider ahead of time and tell them that you have or may ? At least 24 hours have passed since recovery defined as resolution of fever without the use of fever-reducing medications; and  · Improvement in respiratory symptoms (e.g., cough, shortness of breath); and  · At least 10 days have passed since symptoms f If you would be interested in donating your plasma to help treat others diagnosed with the virus, please contact [facility] directly on their website: [email or web address]    Who is eligible to donate convalescent plasma?